# Patient Record
Sex: MALE | Race: WHITE | Employment: PART TIME | URBAN - METROPOLITAN AREA
[De-identification: names, ages, dates, MRNs, and addresses within clinical notes are randomized per-mention and may not be internally consistent; named-entity substitution may affect disease eponyms.]

---

## 2019-02-04 ENCOUNTER — HOSPITAL ENCOUNTER (OUTPATIENT)
Dept: LAB | Age: 59
Discharge: HOME | End: 2019-02-04
Attending: INTERNAL MEDICINE
Payer: MEDICAID

## 2019-02-04 DIAGNOSIS — I48.0 PAROXYSMAL ATRIAL FIBRILLATION (HCC): ICD-10-CM

## 2019-02-04 LAB
ANION GAP SERPL CALC-SCNC: 7 MMOL/L
BASOPHILS # BLD: 0.1 K/UL (ref 0–0.2)
BASOPHILS NFR BLD: 1 % (ref 0–2)
BUN SERPL-MCNC: 16 MG/DL (ref 6–23)
CALCIUM SERPL-MCNC: 8.5 MG/DL (ref 8.3–10.4)
CHLORIDE SERPL-SCNC: 107 MMOL/L (ref 98–107)
CO2 SERPL-SCNC: 26 MMOL/L (ref 21–32)
CREAT SERPL-MCNC: 1.2 MG/DL (ref 0.8–1.5)
DIFFERENTIAL METHOD BLD: NORMAL
EOSINOPHIL # BLD: 0.4 K/UL (ref 0–0.8)
EOSINOPHIL NFR BLD: 4 % (ref 0.5–7.8)
ERYTHROCYTE [DISTWIDTH] IN BLOOD BY AUTOMATED COUNT: 14.2 % (ref 11.9–14.6)
GLUCOSE SERPL-MCNC: 123 MG/DL (ref 65–100)
HCT VFR BLD AUTO: 48.8 % (ref 41.1–50.3)
HGB BLD-MCNC: 15.9 G/DL (ref 13.6–17.2)
IMM GRANULOCYTES # BLD AUTO: 0 K/UL (ref 0–0.5)
IMM GRANULOCYTES NFR BLD AUTO: 0 % (ref 0–5)
LYMPHOCYTES # BLD: 2.3 K/UL (ref 0.5–4.6)
LYMPHOCYTES NFR BLD: 28 % (ref 13–44)
MAGNESIUM SERPL-MCNC: 2.1 MG/DL (ref 1.8–2.4)
MCH RBC QN AUTO: 31.4 PG (ref 26.1–32.9)
MCHC RBC AUTO-ENTMCNC: 32.6 G/DL (ref 31.4–35)
MCV RBC AUTO: 96.3 FL (ref 79.6–97.8)
MONOCYTES # BLD: 0.9 K/UL (ref 0.1–1.3)
MONOCYTES NFR BLD: 10 % (ref 4–12)
NEUTS SEG # BLD: 4.7 K/UL (ref 1.7–8.2)
NEUTS SEG NFR BLD: 57 % (ref 43–78)
NRBC # BLD: 0 K/UL (ref 0–0.2)
PLATELET # BLD AUTO: 260 K/UL (ref 150–450)
PMV BLD AUTO: 10.1 FL (ref 9.4–12.3)
POTASSIUM SERPL-SCNC: 4.2 MMOL/L (ref 3.5–5.1)
RBC # BLD AUTO: 5.07 M/UL (ref 4.23–5.6)
SODIUM SERPL-SCNC: 140 MMOL/L (ref 136–145)
WBC # BLD AUTO: 8.4 K/UL (ref 4.3–11.1)

## 2019-02-04 PROCEDURE — 36415 COLL VENOUS BLD VENIPUNCTURE: CPT

## 2019-02-04 PROCEDURE — 83735 ASSAY OF MAGNESIUM: CPT

## 2019-02-04 PROCEDURE — 80048 BASIC METABOLIC PNL TOTAL CA: CPT

## 2019-02-04 PROCEDURE — 85025 COMPLETE CBC W/AUTO DIFF WBC: CPT

## 2019-02-06 NOTE — PROGRESS NOTES
Patient pre-assessment complete for Atrial fib ablation with Dr Pamela Ibarra scheduled for 19 at 7:30am, arrival time 6. Patient verified using . Patient instructed to bring all home medications in labeled bottles on the day of procedure. NPO status reinforced. Patient instructed to HOLD am. Instructed they can take all other medications excluding vitamins & supplements. Patient verbalizes understanding of all instructions & denies any questions at this time.

## 2019-02-07 ENCOUNTER — HOSPITAL ENCOUNTER (OUTPATIENT)
Age: 59
Setting detail: OBSERVATION
Discharge: HOME | End: 2019-02-08
Attending: INTERNAL MEDICINE | Admitting: INTERNAL MEDICINE
Payer: MEDICAID

## 2019-02-07 ENCOUNTER — APPOINTMENT (OUTPATIENT)
Dept: GENERAL RADIOLOGY | Age: 59
End: 2019-02-07
Attending: INTERNAL MEDICINE
Payer: MEDICAID

## 2019-02-07 ENCOUNTER — ANESTHESIA EVENT (OUTPATIENT)
Dept: SURGERY | Age: 59
End: 2019-02-07
Payer: MEDICAID

## 2019-02-07 ENCOUNTER — APPOINTMENT (OUTPATIENT)
Dept: CARDIAC CATH/INVASIVE PROCEDURES | Age: 59
End: 2019-02-07
Payer: MEDICAID

## 2019-02-07 ENCOUNTER — ANESTHESIA (OUTPATIENT)
Dept: SURGERY | Age: 59
End: 2019-02-07
Payer: MEDICAID

## 2019-02-07 PROBLEM — I48.91 A-FIB (HCC): Status: ACTIVE | Noted: 2019-02-07

## 2019-02-07 LAB
ACT BLD: 307 SECS (ref 70–128)
ACT BLD: 351 SECS (ref 70–128)
ANION GAP SERPL CALC-SCNC: 10 MMOL/L (ref 7–16)
ATRIAL RATE: 58 BPM
ATRIAL RATE: 61 BPM
BUN SERPL-MCNC: 17 MG/DL (ref 6–23)
CALCIUM SERPL-MCNC: 8.8 MG/DL (ref 8.3–10.4)
CALCULATED P AXIS, ECG09: 52 DEGREES
CALCULATED P AXIS, ECG09: 62 DEGREES
CALCULATED R AXIS, ECG10: 18 DEGREES
CALCULATED R AXIS, ECG10: 36 DEGREES
CALCULATED T AXIS, ECG11: 47 DEGREES
CALCULATED T AXIS, ECG11: 53 DEGREES
CHLORIDE SERPL-SCNC: 105 MMOL/L (ref 98–107)
CO2 SERPL-SCNC: 25 MMOL/L (ref 21–32)
CREAT SERPL-MCNC: 1.1 MG/DL (ref 0.8–1.5)
DIAGNOSIS, 93000: NORMAL
DIAGNOSIS, 93000: NORMAL
ERYTHROCYTE [DISTWIDTH] IN BLOOD BY AUTOMATED COUNT: 14 % (ref 11.9–14.6)
GLUCOSE SERPL-MCNC: 116 MG/DL (ref 65–100)
HCT VFR BLD AUTO: 48.4 % (ref 41.1–50.3)
HGB BLD-MCNC: 15.5 G/DL (ref 13.6–17.2)
INR PPP: 1
MAGNESIUM SERPL-MCNC: 2.3 MG/DL (ref 1.8–2.4)
MCH RBC QN AUTO: 31.3 PG (ref 26.1–32.9)
MCHC RBC AUTO-ENTMCNC: 32 G/DL (ref 31.4–35)
MCV RBC AUTO: 97.8 FL (ref 79.6–97.8)
NRBC # BLD: 0 K/UL (ref 0–0.2)
P-R INTERVAL, ECG05: 170 MS
P-R INTERVAL, ECG05: 178 MS
PLATELET # BLD AUTO: 236 K/UL (ref 150–450)
PMV BLD AUTO: 11 FL (ref 9.4–12.3)
POTASSIUM SERPL-SCNC: 3.9 MMOL/L (ref 3.5–5.1)
PROTHROMBIN TIME: 12.6 SEC (ref 11.7–14.5)
Q-T INTERVAL, ECG07: 472 MS
Q-T INTERVAL, ECG07: 482 MS
QRS DURATION, ECG06: 120 MS
QRS DURATION, ECG06: 128 MS
QTC CALCULATION (BEZET), ECG08: 463 MS
QTC CALCULATION (BEZET), ECG08: 485 MS
RBC # BLD AUTO: 4.95 M/UL (ref 4.23–5.6)
SODIUM SERPL-SCNC: 140 MMOL/L (ref 136–145)
VENTRICULAR RATE, ECG03: 58 BPM
VENTRICULAR RATE, ECG03: 61 BPM
WBC # BLD AUTO: 7.8 K/UL (ref 4.3–11.1)

## 2019-02-07 PROCEDURE — 76210000006 HC OR PH I REC 0.5 TO 1 HR: Performed by: INTERNAL MEDICINE

## 2019-02-07 PROCEDURE — 74011250637 HC RX REV CODE- 250/637: Performed by: INTERNAL MEDICINE

## 2019-02-07 PROCEDURE — C1894 INTRO/SHEATH, NON-LASER: HCPCS

## 2019-02-07 PROCEDURE — 74011250636 HC RX REV CODE- 250/636

## 2019-02-07 PROCEDURE — 85347 COAGULATION TIME ACTIVATED: CPT

## 2019-02-07 PROCEDURE — 85027 COMPLETE CBC AUTOMATED: CPT

## 2019-02-07 PROCEDURE — 99218 HC RM OBSERVATION: CPT

## 2019-02-07 PROCEDURE — C1732 CATH, EP, DIAG/ABL, 3D/VECT: HCPCS

## 2019-02-07 PROCEDURE — 76060000035 HC ANESTHESIA 2 TO 2.5 HR: Performed by: INTERNAL MEDICINE

## 2019-02-07 PROCEDURE — 77030039425 HC BLD LARYNG TRULITE DISP TELE -A: Performed by: NURSE ANESTHETIST, CERTIFIED REGISTERED

## 2019-02-07 PROCEDURE — C1733 CATH, EP, OTHR THAN COOL-TIP: HCPCS

## 2019-02-07 PROCEDURE — 77030020506 HC NDL TRNSPTL NRG BAYL -F

## 2019-02-07 PROCEDURE — 77030019908 HC STETH ESOPH SIMS -A: Performed by: NURSE ANESTHETIST, CERTIFIED REGISTERED

## 2019-02-07 PROCEDURE — 93662 INTRACARDIAC ECG (ICE): CPT

## 2019-02-07 PROCEDURE — 93655 ICAR CATH ABLTJ DSCRT ARRHYT: CPT

## 2019-02-07 PROCEDURE — C1759 CATH, INTRA ECHOCARDIOGRAPHY: HCPCS

## 2019-02-07 PROCEDURE — 71045 X-RAY EXAM CHEST 1 VIEW: CPT

## 2019-02-07 PROCEDURE — 77030013687 HC GD NDL BARD -B

## 2019-02-07 PROCEDURE — 93613 INTRACARDIAC EPHYS 3D MAPG: CPT

## 2019-02-07 PROCEDURE — 83735 ASSAY OF MAGNESIUM: CPT

## 2019-02-07 PROCEDURE — 93005 ELECTROCARDIOGRAM TRACING: CPT | Performed by: INTERNAL MEDICINE

## 2019-02-07 PROCEDURE — 93623 PRGRMD STIMJ&PACG IV RX NFS: CPT

## 2019-02-07 PROCEDURE — 77030037088 HC TUBE ENDOTRACH ORAL NSL COVD-A: Performed by: NURSE ANESTHETIST, CERTIFIED REGISTERED

## 2019-02-07 PROCEDURE — 77030027107 HC PTCH EXT REF CARTO3 J&J -F

## 2019-02-07 PROCEDURE — C1893 INTRO/SHEATH, FIXED,NON-PEEL: HCPCS

## 2019-02-07 PROCEDURE — 74011250636 HC RX REV CODE- 250/636: Performed by: ANESTHESIOLOGY

## 2019-02-07 PROCEDURE — 77030013794 HC KT TRNSDUC BLD EDWD -B

## 2019-02-07 PROCEDURE — 74011000250 HC RX REV CODE- 250

## 2019-02-07 PROCEDURE — 85610 PROTHROMBIN TIME: CPT

## 2019-02-07 PROCEDURE — 80048 BASIC METABOLIC PNL TOTAL CA: CPT

## 2019-02-07 PROCEDURE — 93312 ECHO TRANSESOPHAGEAL: CPT

## 2019-02-07 PROCEDURE — 77030035291 HC TBNG PMP SMARTABLATE J&J -B

## 2019-02-07 PROCEDURE — 93622 COMP EP EVAL L VENTR PAC&REC: CPT

## 2019-02-07 PROCEDURE — 93656 COMPRE EP EVAL ABLTJ ATR FIB: CPT

## 2019-02-07 RX ORDER — SUCRALFATE 1 G/1
1 TABLET ORAL
Qty: 120 TAB | Refills: 0 | Status: SHIPPED | OUTPATIENT
Start: 2019-02-08 | End: 2019-04-11

## 2019-02-07 RX ORDER — PANTOPRAZOLE SODIUM 40 MG/1
40 TABLET, DELAYED RELEASE ORAL EVERY 12 HOURS
Status: DISCONTINUED | OUTPATIENT
Start: 2019-02-07 | End: 2019-02-08 | Stop reason: HOSPADM

## 2019-02-07 RX ORDER — NEOSTIGMINE METHYLSULFATE 1 MG/ML
INJECTION INTRAVENOUS AS NEEDED
Status: DISCONTINUED | OUTPATIENT
Start: 2019-02-07 | End: 2019-02-07 | Stop reason: HOSPADM

## 2019-02-07 RX ORDER — SODIUM CHLORIDE, SODIUM LACTATE, POTASSIUM CHLORIDE, CALCIUM CHLORIDE 600; 310; 30; 20 MG/100ML; MG/100ML; MG/100ML; MG/100ML
INJECTION, SOLUTION INTRAVENOUS
Status: DISCONTINUED | OUTPATIENT
Start: 2019-02-07 | End: 2019-02-07 | Stop reason: HOSPADM

## 2019-02-07 RX ORDER — LORAZEPAM 0.5 MG/1
0.5 TABLET ORAL
Status: DISCONTINUED | OUTPATIENT
Start: 2019-02-07 | End: 2019-02-08 | Stop reason: HOSPADM

## 2019-02-07 RX ORDER — PANTOPRAZOLE SODIUM 40 MG/1
40 TABLET, DELAYED RELEASE ORAL EVERY 12 HOURS
Qty: 60 TAB | Refills: 0 | Status: SHIPPED | OUTPATIENT
Start: 2019-02-08 | End: 2019-11-18

## 2019-02-07 RX ORDER — SUCRALFATE 1 G/1
1 TABLET ORAL
Status: DISCONTINUED | OUTPATIENT
Start: 2019-02-07 | End: 2019-02-08 | Stop reason: HOSPADM

## 2019-02-07 RX ORDER — DEXAMETHASONE SODIUM PHOSPHATE 4 MG/ML
INJECTION, SOLUTION INTRA-ARTICULAR; INTRALESIONAL; INTRAMUSCULAR; INTRAVENOUS; SOFT TISSUE AS NEEDED
Status: DISCONTINUED | OUTPATIENT
Start: 2019-02-07 | End: 2019-02-07 | Stop reason: HOSPADM

## 2019-02-07 RX ORDER — FLECAINIDE ACETATE 100 MG/1
100 TABLET ORAL 2 TIMES DAILY
Status: DISCONTINUED | OUTPATIENT
Start: 2019-02-07 | End: 2019-02-08 | Stop reason: HOSPADM

## 2019-02-07 RX ORDER — ESCITALOPRAM OXALATE 10 MG/1
20 TABLET ORAL DAILY
Status: DISCONTINUED | OUTPATIENT
Start: 2019-02-07 | End: 2019-02-08 | Stop reason: HOSPADM

## 2019-02-07 RX ORDER — SODIUM CHLORIDE, SODIUM LACTATE, POTASSIUM CHLORIDE, CALCIUM CHLORIDE 600; 310; 30; 20 MG/100ML; MG/100ML; MG/100ML; MG/100ML
100 INJECTION, SOLUTION INTRAVENOUS CONTINUOUS
Status: DISCONTINUED | OUTPATIENT
Start: 2019-02-07 | End: 2019-02-07 | Stop reason: HOSPADM

## 2019-02-07 RX ORDER — LIDOCAINE HYDROCHLORIDE 10 MG/ML
0.3 INJECTION INFILTRATION; PERINEURAL ONCE
Status: CANCELLED | OUTPATIENT
Start: 2019-02-07 | End: 2019-02-07

## 2019-02-07 RX ORDER — SODIUM CHLORIDE, SODIUM LACTATE, POTASSIUM CHLORIDE, CALCIUM CHLORIDE 600; 310; 30; 20 MG/100ML; MG/100ML; MG/100ML; MG/100ML
100 INJECTION, SOLUTION INTRAVENOUS CONTINUOUS
Status: CANCELLED | OUTPATIENT
Start: 2019-02-07 | End: 2019-02-08

## 2019-02-07 RX ORDER — SODIUM CHLORIDE 0.9 % (FLUSH) 0.9 %
5-40 SYRINGE (ML) INJECTION EVERY 8 HOURS
Status: DISCONTINUED | OUTPATIENT
Start: 2019-02-07 | End: 2019-02-08 | Stop reason: HOSPADM

## 2019-02-07 RX ORDER — ACETAMINOPHEN 325 MG/1
650 TABLET ORAL
Status: DISCONTINUED | OUTPATIENT
Start: 2019-02-07 | End: 2019-02-08 | Stop reason: HOSPADM

## 2019-02-07 RX ORDER — MIDAZOLAM HYDROCHLORIDE 1 MG/ML
2 INJECTION, SOLUTION INTRAMUSCULAR; INTRAVENOUS
Status: CANCELLED | OUTPATIENT
Start: 2019-02-07 | End: 2019-02-08

## 2019-02-07 RX ORDER — LIDOCAINE HYDROCHLORIDE 20 MG/ML
INJECTION, SOLUTION EPIDURAL; INFILTRATION; INTRACAUDAL; PERINEURAL AS NEEDED
Status: DISCONTINUED | OUTPATIENT
Start: 2019-02-07 | End: 2019-02-07 | Stop reason: HOSPADM

## 2019-02-07 RX ORDER — OXYCODONE HYDROCHLORIDE 5 MG/1
10 TABLET ORAL
Status: DISCONTINUED | OUTPATIENT
Start: 2019-02-07 | End: 2019-02-07 | Stop reason: HOSPADM

## 2019-02-07 RX ORDER — HYDROCODONE BITARTRATE AND ACETAMINOPHEN 5; 325 MG/1; MG/1
1 TABLET ORAL
Status: DISCONTINUED | OUTPATIENT
Start: 2019-02-07 | End: 2019-02-08 | Stop reason: HOSPADM

## 2019-02-07 RX ORDER — PROTAMINE SULFATE 10 MG/ML
INJECTION, SOLUTION INTRAVENOUS AS NEEDED
Status: DISCONTINUED | OUTPATIENT
Start: 2019-02-07 | End: 2019-02-07 | Stop reason: HOSPADM

## 2019-02-07 RX ORDER — PROPOFOL 10 MG/ML
INJECTION, EMULSION INTRAVENOUS AS NEEDED
Status: DISCONTINUED | OUTPATIENT
Start: 2019-02-07 | End: 2019-02-07 | Stop reason: HOSPADM

## 2019-02-07 RX ORDER — ONDANSETRON 2 MG/ML
INJECTION INTRAMUSCULAR; INTRAVENOUS AS NEEDED
Status: DISCONTINUED | OUTPATIENT
Start: 2019-02-07 | End: 2019-02-07 | Stop reason: HOSPADM

## 2019-02-07 RX ORDER — HYDROMORPHONE HYDROCHLORIDE 2 MG/ML
0.5 INJECTION, SOLUTION INTRAMUSCULAR; INTRAVENOUS; SUBCUTANEOUS
Status: DISCONTINUED | OUTPATIENT
Start: 2019-02-07 | End: 2019-02-07 | Stop reason: HOSPADM

## 2019-02-07 RX ORDER — LISINOPRIL 5 MG/1
10 TABLET ORAL DAILY
Status: DISCONTINUED | OUTPATIENT
Start: 2019-02-07 | End: 2019-02-08 | Stop reason: HOSPADM

## 2019-02-07 RX ORDER — HEPARIN SODIUM 1000 [USP'U]/ML
INJECTION, SOLUTION INTRAVENOUS; SUBCUTANEOUS AS NEEDED
Status: DISCONTINUED | OUTPATIENT
Start: 2019-02-07 | End: 2019-02-07 | Stop reason: HOSPADM

## 2019-02-07 RX ORDER — ROCURONIUM BROMIDE 10 MG/ML
INJECTION, SOLUTION INTRAVENOUS AS NEEDED
Status: DISCONTINUED | OUTPATIENT
Start: 2019-02-07 | End: 2019-02-07 | Stop reason: HOSPADM

## 2019-02-07 RX ORDER — GLYCOPYRROLATE 0.2 MG/ML
INJECTION INTRAMUSCULAR; INTRAVENOUS AS NEEDED
Status: DISCONTINUED | OUTPATIENT
Start: 2019-02-07 | End: 2019-02-07 | Stop reason: HOSPADM

## 2019-02-07 RX ORDER — SODIUM CHLORIDE 0.9 % (FLUSH) 0.9 %
5-40 SYRINGE (ML) INJECTION AS NEEDED
Status: DISCONTINUED | OUTPATIENT
Start: 2019-02-07 | End: 2019-02-08 | Stop reason: HOSPADM

## 2019-02-07 RX ORDER — HEPARIN SODIUM 5000 [USP'U]/100ML
INJECTION, SOLUTION INTRAVENOUS
Status: DISCONTINUED | OUTPATIENT
Start: 2019-02-07 | End: 2019-02-07 | Stop reason: HOSPADM

## 2019-02-07 RX ORDER — FENTANYL CITRATE 50 UG/ML
INJECTION, SOLUTION INTRAMUSCULAR; INTRAVENOUS AS NEEDED
Status: DISCONTINUED | OUTPATIENT
Start: 2019-02-07 | End: 2019-02-07 | Stop reason: HOSPADM

## 2019-02-07 RX ADMIN — HYDROCODONE BITARTRATE AND ACETAMINOPHEN 1 TABLET: 5; 325 TABLET ORAL at 11:16

## 2019-02-07 RX ADMIN — PROTAMINE SULFATE 10 MG: 10 INJECTION, SOLUTION INTRAVENOUS at 09:25

## 2019-02-07 RX ADMIN — SUCRALFATE 1 G: 1 TABLET ORAL at 11:16

## 2019-02-07 RX ADMIN — PROTAMINE SULFATE 10 MG: 10 INJECTION, SOLUTION INTRAVENOUS at 09:23

## 2019-02-07 RX ADMIN — ROCURONIUM BROMIDE 10 MG: 10 INJECTION, SOLUTION INTRAVENOUS at 08:42

## 2019-02-07 RX ADMIN — HYDROMORPHONE HYDROCHLORIDE 0.5 MG: 2 INJECTION, SOLUTION INTRAMUSCULAR; INTRAVENOUS; SUBCUTANEOUS at 09:57

## 2019-02-07 RX ADMIN — HYDROCODONE BITARTRATE AND ACETAMINOPHEN 1 TABLET: 5; 325 TABLET ORAL at 18:31

## 2019-02-07 RX ADMIN — PANTOPRAZOLE SODIUM 40 MG: 40 TABLET, DELAYED RELEASE ORAL at 11:16

## 2019-02-07 RX ADMIN — PROTAMINE SULFATE 10 MG: 10 INJECTION, SOLUTION INTRAVENOUS at 09:19

## 2019-02-07 RX ADMIN — PROTAMINE SULFATE 10 MG: 10 INJECTION, SOLUTION INTRAVENOUS at 09:24

## 2019-02-07 RX ADMIN — LISINOPRIL 10 MG: 5 TABLET ORAL at 11:16

## 2019-02-07 RX ADMIN — PANTOPRAZOLE SODIUM 40 MG: 40 TABLET, DELAYED RELEASE ORAL at 21:32

## 2019-02-07 RX ADMIN — PROTAMINE SULFATE 10 MG: 10 INJECTION, SOLUTION INTRAVENOUS at 09:18

## 2019-02-07 RX ADMIN — HEPARIN SODIUM 10000 UNITS: 1000 INJECTION, SOLUTION INTRAVENOUS; SUBCUTANEOUS at 08:10

## 2019-02-07 RX ADMIN — SUCRALFATE 1 G: 1 TABLET ORAL at 21:32

## 2019-02-07 RX ADMIN — RIVAROXABAN 20 MG: 20 TABLET, FILM COATED ORAL at 18:31

## 2019-02-07 RX ADMIN — ROCURONIUM BROMIDE 10 MG: 10 INJECTION, SOLUTION INTRAVENOUS at 08:09

## 2019-02-07 RX ADMIN — NEOSTIGMINE METHYLSULFATE 3 MG: 1 INJECTION INTRAVENOUS at 09:17

## 2019-02-07 RX ADMIN — PROPOFOL 200 MG: 10 INJECTION, EMULSION INTRAVENOUS at 07:34

## 2019-02-07 RX ADMIN — PROTAMINE SULFATE 10 MG: 10 INJECTION, SOLUTION INTRAVENOUS at 09:20

## 2019-02-07 RX ADMIN — HYDROMORPHONE HYDROCHLORIDE 0.5 MG: 2 INJECTION, SOLUTION INTRAMUSCULAR; INTRAVENOUS; SUBCUTANEOUS at 10:01

## 2019-02-07 RX ADMIN — HEPARIN SODIUM 10000 UNITS: 1000 INJECTION, SOLUTION INTRAVENOUS; SUBCUTANEOUS at 07:57

## 2019-02-07 RX ADMIN — HYDROCODONE BITARTRATE AND ACETAMINOPHEN 1 TABLET: 5; 325 TABLET ORAL at 22:50

## 2019-02-07 RX ADMIN — HYDROCODONE BITARTRATE AND ACETAMINOPHEN 1 TABLET: 5; 325 TABLET ORAL at 14:49

## 2019-02-07 RX ADMIN — ROCURONIUM BROMIDE 5 MG: 10 INJECTION, SOLUTION INTRAVENOUS at 07:57

## 2019-02-07 RX ADMIN — Medication 10 ML: at 21:32

## 2019-02-07 RX ADMIN — PROPOFOL 50 MG: 10 INJECTION, EMULSION INTRAVENOUS at 08:42

## 2019-02-07 RX ADMIN — DEXAMETHASONE SODIUM PHOSPHATE 4 MG: 4 INJECTION, SOLUTION INTRA-ARTICULAR; INTRALESIONAL; INTRAMUSCULAR; INTRAVENOUS; SOFT TISSUE at 08:51

## 2019-02-07 RX ADMIN — GLYCOPYRROLATE 0.4 MG: 0.2 INJECTION INTRAMUSCULAR; INTRAVENOUS at 09:17

## 2019-02-07 RX ADMIN — FLECAINIDE ACETATE 100 MG: 100 TABLET ORAL at 11:16

## 2019-02-07 RX ADMIN — ONDANSETRON 4 MG: 2 INJECTION INTRAMUSCULAR; INTRAVENOUS at 08:51

## 2019-02-07 RX ADMIN — ESCITALOPRAM OXALATE 20 MG: 10 TABLET ORAL at 11:16

## 2019-02-07 RX ADMIN — SUCRALFATE 1 G: 1 TABLET ORAL at 18:31

## 2019-02-07 RX ADMIN — Medication 10 ML: at 18:32

## 2019-02-07 RX ADMIN — ROCURONIUM BROMIDE 10 MG: 10 INJECTION, SOLUTION INTRAVENOUS at 08:18

## 2019-02-07 RX ADMIN — FENTANYL CITRATE 100 MCG: 50 INJECTION, SOLUTION INTRAMUSCULAR; INTRAVENOUS at 07:34

## 2019-02-07 RX ADMIN — PROTAMINE SULFATE 20 MG: 10 INJECTION, SOLUTION INTRAVENOUS at 09:22

## 2019-02-07 RX ADMIN — ROCURONIUM BROMIDE 10 MG: 10 INJECTION, SOLUTION INTRAVENOUS at 09:00

## 2019-02-07 RX ADMIN — PROTAMINE SULFATE 20 MG: 10 INJECTION, SOLUTION INTRAVENOUS at 09:21

## 2019-02-07 RX ADMIN — PROPOFOL 50 MG: 10 INJECTION, EMULSION INTRAVENOUS at 08:09

## 2019-02-07 RX ADMIN — LIDOCAINE HYDROCHLORIDE 80 MG: 20 INJECTION, SOLUTION EPIDURAL; INFILTRATION; INTRACAUDAL; PERINEURAL at 07:34

## 2019-02-07 RX ADMIN — SODIUM CHLORIDE, SODIUM LACTATE, POTASSIUM CHLORIDE, CALCIUM CHLORIDE: 600; 310; 30; 20 INJECTION, SOLUTION INTRAVENOUS at 07:24

## 2019-02-07 RX ADMIN — FLECAINIDE ACETATE 100 MG: 100 TABLET ORAL at 18:31

## 2019-02-07 RX ADMIN — HEPARIN SODIUM 40 UNITS/KG/HR: 5000 INJECTION, SOLUTION INTRAVENOUS at 08:10

## 2019-02-07 RX ADMIN — ROCURONIUM BROMIDE 50 MG: 10 INJECTION, SOLUTION INTRAVENOUS at 07:34

## 2019-02-07 NOTE — PROCEDURES
Pre-Electrophysiology Diagnosis  1. Atrial fibrillation failing medical therapy     Procedure Performed  1. EPS afib ablation/pulmonary vein isolation  2. Left atrial pacing recording from the coronary sinus. 3. 3-D Electroanatomical mapping  4. Intracardiac echo  5. Ablation of second arrhythmia  6. LV pacing and recording  7. Transesophageal echo  8. Drug infusion    Anesthesia: General     Estimated Blood Loss: Less than 10 mL     Specimens: * No specimens in log *    Procedure in Detail:  The patient was brought to the electrophysiology lab in the fasting state. The patient was intubated by anesthesiology and an esophageal temperature probe inserted and advanced to a location directly posterior to the LA at the level of the pulmonary veins. The esophageal temperature probe was repositioned throughout the case to a location as close the the ablation catheter as possible. If the esophageal temperature increased 0.5 degrees Celsius ablation was stopped until the temperature returned to baseline. A tranesophageal echocardiogram was performed directly prior to the procedure and was negative for a JOHN thrombus (see full report in chart). A Ref-Star CARTO patch was placed, the patient was then prepped and draped in sterile fashion. Venous access was obtained under ultrasound guidance x4 using modified Seldinger technique, with placement of one 8Fr short sidearm sheath and two 8.5 Fr SL-O 63cm long braided sheaths in the right femoral vein and placement of a 10Fr sheath into the left femoral vein. An intra-cardiac echo probe was prepped, and then inserted into an 10 Fr short sheath and used to localize the fossa ovalis and create LA and pulmonary vein anatomy utilizing [employer] [facility]. A [employer] (P)ter Pentaray catheter was then inserted into an 8.5 SLO Fr sheath and used to create an electroanatomical map of the right atrium, including attempts at His localization and the os of the CS.   Then a Smart Touch porous irrigated BW 3.5mm ablation catheter was used to map out the body of the CS. A decapolar Bios[employer] Girard CS catheter was inserted via an 8Fr sheath and positioned in the mid coronary sinus. Next, a trans-septal needle was inserted into the SLO and was used to perform a trans-septal puncture with assistance from ICE (intra-cardiac echo) as well as the [address] [place] [address],[address] map and the right atrial impedence map. The SLO sheath was advanced into the LA. Total weight based heparin bolus was administered just after transeptal access, and systemic blood pressure monitored invasively. The patient was then placed on our heparin weight based nomogram for targeted ACT of 300-350 during the ablation procedure. A second trans-septal access was obtained with the ablation catheter. The Pentaray catheter was then inserted into the LA via the SL-O sheath and was used to create a detail electroanatomical voltage map of the left atrium including the pulmonary veins to identify the pulmonary vein sleeves and further guide additional ablation as pictured below. The Pentaray was used to map pulmonary vein potentials and target ablation. An 8 Fr, 3.5mm tip saline irrigated bidirectional D/F curve Thermo-cool SmartTouch catheter was used for RF ablation and ablation was performed at 40W unless ablation was in the proximity of the esophagus where ablation was performed at 25W. Antral pulmonary vein isolation was then performed for all 4 pulmonary veins. Entrance and exit block was demonstrated by left atrial pacing and within the pulmonary veins. Lack of any conducted atrial EGMs into the pulmonary veins was documented. Prior to ablation of the right antral pulmonary veins, the ablation catheter was used to pace at high output to try to localize the right phrenic nerve and map its location prior to ablation.  Adenosine was injected (12 mg) to demonstrate the lack of early reconnection with the Pentaray in the PVs. There was early reconnection with adenosine within the right pulmonary veins. The ablation catheter was inserted into the LV, documented LV electrograms and was used to pace the LV for the EP study and for rescue pacing during adenosine infusion. Post PVI, the Pentaray was used to perform a second LA voltage map post ablation to demonstrate pulmonary vein isolation and post ablation voltage as pictured below. Baseline LA Voltage Map      Post Ablation LA Voltage Map      Cavotricuspid Isthmus ablation (right atrial flutter)  Linear ablation across the cavo-tricuspid isthmus was performed starting with 1:2 A:V EGMs along the isthmus at 6pm Bulgarian. The local electrogram activation sequence, differential pacing maneuvers and electrogram timing was used to demonstrate bidirectional block along the cavotricuspid isthmus. Post-Ablation trans-isthmus time: 161 msec    Next, baseline recordings and a diagnostic EP study was performed. The coronary sinus multipolar catheter was used to pace the left atrium during the EP study. The LA CS electrograms were documented and interpreted during the procedure. A comprehensive EP study was performed with 1:1 AV decremental pacing, atrial extrastimuli and ventricular pacing to assess retrograde conduction. The patient did not have sustained slow pathway conduction or evidence of an accessory pathway. Ventricular pacing revealed retrograde AV conduction which was concentric and decremental.    At the completion of the ablation and EPS, all catheters were removed, 100mg Protamine was administered and sheaths were pulled after placing a figure of 8 stitch. The patient tolerated the procedure well with no acute complications recognized. The patient left the EP lab in stable condition, in normal sinus rhythm. Just prior to pulling shealths, the ICE catheter was used to obtain ultrasound images and revealed no evidence of pericardial effusion.     Baseline Intervals    QRS duration: 96 msec  KS interval: 171 msec  RR interval: 1012 msec  AH interval: 85 msec  HV interval: 47 msec    EP Study    AV Wenchebach: 360 msec  AV zeb ERP: < or equal to 250 msec  Atrial ERP: 250 msec  VA Wenchebach: No VA conduction    Complications: None    Summary:   1. Successful pulmonary vein isolation x4.  2. Creation of a line of bidirectional block at the cavotricuspid isthmus. 3.         Comprehensive EP study. 4. Pt tolerated the procedure well. 5. Family updated. Dorys Almendarez MD, MS  Clinical Cardiac Electrophysiology  2/7/2019  9:25 AM

## 2019-02-07 NOTE — PROGRESS NOTES
Spiritual Care Visit, initial visit. Visited with patient at bedside. Prayed for patient's healing and health. Visit by Jose Antonio Garcia, Staff .  Michael., Saeid.JOEL., B.A.

## 2019-02-07 NOTE — ANESTHESIA PREPROCEDURE EVALUATION
Anesthetic History   No history of anesthetic complications            Review of Systems / Medical History  Patient summary reviewed and pertinent labs reviewed    Pulmonary          Smoker      Comments: Suspect YANIV, no sleep study   Neuro/Psych              Cardiovascular    Hypertension  Valvular problems/murmurs (mild aortic stenosis IBRAHIMA 1.77cm2 echo 2017)      Dysrhythmias : atrial fibrillation      Exercise tolerance: >4 METS     GI/Hepatic/Renal  Within defined limits              Endo/Other        Obesity     Other Findings              Physical Exam    Airway  Mallampati: II  TM Distance: 4 - 6 cm  Neck ROM: normal range of motion   Mouth opening: Normal     Cardiovascular    Rhythm: regular  Rate: abnormal    Murmur: Grade 2, Aortic area    Comments: Sinus jeff on EKG Dental    Dentition: Lower partial plate     Pulmonary  Breath sounds clear to auscultation               Abdominal         Other Findings            Anesthetic Plan    ASA: 3  Anesthesia type: general          Induction: Intravenous  Anesthetic plan and risks discussed with: Patient and Spouse

## 2019-02-07 NOTE — PROGRESS NOTES
Pt transferred to CV ICU. CM reviewed pt's chart. No identified needs at this time per chart. DECO aware of pt's insurance status and his information has been passed to [address] for review per chart notes. CM to continue to follow.

## 2019-02-07 NOTE — PERIOP NOTES
Dr. Kalli Krause ok with sending pt over at this time.
TRANSFER - OUT REPORT:    Verbal report given to Nicholas Gonzalez (name) on Deven Medina  being transferred to CVICU 1(unit) for routine post - op       Report consisted of patients Situation, Background, Assessment and   Recommendations(SBAR). Information from the following report(s) SBAR, OR Summary and Procedure Summary was reviewed with the receiving nurse. Lines:   Peripheral IV 02/07/19 Right Antecubital (Active)   Site Assessment Clean, dry, & intact 2/7/2019 10:08 AM   Phlebitis Assessment 0 2/7/2019 10:08 AM   Infiltration Assessment 0 2/7/2019 10:08 AM   Dressing Status Clean, dry, & intact 2/7/2019 10:08 AM   Dressing Type Tape;Transparent 2/7/2019 10:08 AM   Hub Color/Line Status Pink;Capped 2/7/2019 10:08 AM       Peripheral IV 02/07/19 Left;Posterior Hand (Active)   Site Assessment Clean, dry, & intact 2/7/2019 10:08 AM   Phlebitis Assessment 0 2/7/2019 10:08 AM   Infiltration Assessment 0 2/7/2019 10:08 AM   Dressing Status Clean, dry, & intact 2/7/2019 10:08 AM   Dressing Type Tape;Transparent 2/7/2019 10:08 AM   Hub Color/Line Status Pink; Infusing 2/7/2019 10:08 AM        Opportunity for questions and clarification was provided. Patient transported with:   O2 @ 4 liters    VTE prophylaxis orders have been written for Deven Medina. Patient and family given floor number and nurses name. Family updated re: pt status after security code verified.
ekg called at this time
English

## 2019-02-07 NOTE — ANESTHESIA POSTPROCEDURE EVALUATION
Procedure(s):  MOSES/ ABLATION OF ATRIAL FIBRILLATION.     Anesthesia Post Evaluation      Multimodal analgesia: multimodal analgesia used between 6 hours prior to anesthesia start to PACU discharge  Patient location during evaluation: PACU  Patient participation: complete - patient participated  Level of consciousness: awake  Pain management: adequate  Airway patency: patent  Anesthetic complications: no  Cardiovascular status: acceptable  Respiratory status: acceptable  Hydration status: acceptable  Post anesthesia nausea and vomiting:  none      Visit Vitals  /67 (BP 1 Location: Right arm, BP Patient Position: At rest)   Pulse 65   Temp 36.6 °C (97.8 °F)   Resp 15   Ht 6' 2\" (1.88 m)   Wt 117.9 kg (260 lb)   SpO2 91%   BMI 33.38 kg/m²

## 2019-02-07 NOTE — PROGRESS NOTES
TRANSFER - IN REPORT:    Verbal report received from BARTOLO You(name) on Siena Villegas  being received from [facility]) for routine post - op      Report consisted of patients Situation, Background, Assessment and   Recommendations(SBAR). Information from the following report(s) SBAR, Kardex, OR Summary, Procedure Summary, Intake/Output, MAR, Accordion, Recent Results and Cardiac Rhythm NSR with PAC's was reviewed with the receiving nurse. Opportunity for questions and clarification was provided. Assessment completed upon patients arrival to unit and care assumed.

## 2019-02-07 NOTE — PROGRESS NOTES
Patient received to [address] [place] [address] room # 9  Ambulatory from [facility]. Patient scheduled for MOSES/AFib Ablation today with Dr Farhan More. Procedure reviewed & questions answered, voiced good understanding consent obtained & placed on chart. All medications and medical history reviewed. Will prep patient per orders. Patient & family updated on plan of care. The patient has a fraility score of 2-WELL, based on patient A&Ox3, patient able to ambulate to room without difficulty.

## 2019-02-08 VITALS
RESPIRATION RATE: 18 BRPM | HEIGHT: 74 IN | BODY MASS INDEX: 34.55 KG/M2 | SYSTOLIC BLOOD PRESSURE: 123 MMHG | DIASTOLIC BLOOD PRESSURE: 86 MMHG | WEIGHT: 269.18 LBS | OXYGEN SATURATION: 95 % | HEART RATE: 64 BPM | TEMPERATURE: 98 F

## 2019-02-08 LAB
ANION GAP SERPL CALC-SCNC: 8 MMOL/L (ref 7–16)
ATRIAL RATE: 64 BPM
BUN SERPL-MCNC: 15 MG/DL (ref 6–23)
CALCIUM SERPL-MCNC: 8.6 MG/DL (ref 8.3–10.4)
CALCULATED P AXIS, ECG09: 59 DEGREES
CALCULATED R AXIS, ECG10: 60 DEGREES
CALCULATED T AXIS, ECG11: 62 DEGREES
CHLORIDE SERPL-SCNC: 103 MMOL/L (ref 98–107)
CO2 SERPL-SCNC: 27 MMOL/L (ref 21–32)
CREAT SERPL-MCNC: 1.02 MG/DL (ref 0.8–1.5)
DIAGNOSIS, 93000: NORMAL
GLUCOSE SERPL-MCNC: 120 MG/DL (ref 65–100)
MAGNESIUM SERPL-MCNC: 2.3 MG/DL (ref 1.8–2.4)
P-R INTERVAL, ECG05: 166 MS
POTASSIUM SERPL-SCNC: 3.9 MMOL/L (ref 3.5–5.1)
Q-T INTERVAL, ECG07: 448 MS
QRS DURATION, ECG06: 118 MS
QTC CALCULATION (BEZET), ECG08: 462 MS
SODIUM SERPL-SCNC: 138 MMOL/L (ref 136–145)
VENTRICULAR RATE, ECG03: 64 BPM

## 2019-02-08 PROCEDURE — 83735 ASSAY OF MAGNESIUM: CPT

## 2019-02-08 PROCEDURE — 36415 COLL VENOUS BLD VENIPUNCTURE: CPT

## 2019-02-08 PROCEDURE — 74011250637 HC RX REV CODE- 250/637: Performed by: INTERNAL MEDICINE

## 2019-02-08 PROCEDURE — 99218 HC RM OBSERVATION: CPT

## 2019-02-08 PROCEDURE — 80048 BASIC METABOLIC PNL TOTAL CA: CPT

## 2019-02-08 PROCEDURE — 93005 ELECTROCARDIOGRAM TRACING: CPT | Performed by: INTERNAL MEDICINE

## 2019-02-08 RX ADMIN — HYDROCODONE BITARTRATE AND ACETAMINOPHEN 1 TABLET: 5; 325 TABLET ORAL at 07:24

## 2019-02-08 RX ADMIN — ESCITALOPRAM OXALATE 20 MG: 10 TABLET ORAL at 08:29

## 2019-02-08 RX ADMIN — LISINOPRIL 10 MG: 5 TABLET ORAL at 08:29

## 2019-02-08 RX ADMIN — FLECAINIDE ACETATE 100 MG: 100 TABLET ORAL at 08:29

## 2019-02-08 RX ADMIN — SUCRALFATE 1 G: 1 TABLET ORAL at 07:24

## 2019-02-08 RX ADMIN — PANTOPRAZOLE SODIUM 40 MG: 40 TABLET, DELAYED RELEASE ORAL at 08:29

## 2019-02-08 RX ADMIN — LEVOTHYROXINE SODIUM 137 MCG: 50 TABLET ORAL at 07:24

## 2019-02-08 RX ADMIN — LORAZEPAM 0.5 MG: 0.5 TABLET ORAL at 03:37

## 2019-02-08 NOTE — DISCHARGE SUMMARY
[address] [place] [address] Cardiology Discharge Summary     Patient ID:  Sarah Henson  [id number]  35 y.o.  1960    Admit date: 2/7/2019    Discharge date:  2/8/19    Admitting Physician: Maryse Toro MD     Discharge Physician: Claudette Mills NP/Dr. Rut Mahoney    Admission Diagnoses: Paroxysmal atrial fibrillation ([facility] [I48.0]  A-fib [facility]) [I48.91]    Discharge Diagnoses:    Diagnosis    A-fib ([facility]    Hypertension    [facility]    Hypothyroidism       Cardiology Procedures this admission:  AFIB ablation  Consults: None    Hospital Course: Patient was seen at the office of [address] [place] [address] Cardiology by Dr. Raeann Ivey for management of AFIB and was subsequently scheduled for an AM Admission ablation at [facility] on 2/7/19. Patient underwent ablation by Dr. Raeann Ivey. Patient tolerated the procedure well and was taken to telemetry for recovery. The morning of discharge, patient was up feeling well without any complaints of chest pain or shortness of breath. Patient's bilateral cath sites were clean, dry and intact without hematoma or bruit. Patient's labs were WNL. Patient was seen and examined by Dr. Rut Mahoney and determined stable and ready for discharge. Patient was instructed on the importance of medication compliance including taking carafate, PPI and Xarelto everyday without missing a dose. The patient will follow up with [address] [place] [address] Cardiology -- Dr. Raeann Ivey in 2-4 weeks. DISPOSITION: The patient is being discharged home in stable condition on a low saturated fat, low cholesterol and low salt diet. The patient is instructed to advance activities as tolerated to the limit of fatigue or shortness of breath. The patient is instructed to avoid all heavy lifting, straining, stooping or squatting for 3-5 days.  The patient is instructed to watch the cath site for bleeding/oozing; if seen, the patient is instructed to apply firm pressure with a clean cloth and call [address] Cardiology at [phone]. The patient is instructed to watch for signs of infection which include: increasing area of redness, fever/hot to touch or purulent drainage at the catheterization site. The patient is instructed not to soak in a bathtub for 7-10 days, but is cleared to shower. Discharge Exam:Patient has been seen by Dr. Valencia Precise: see his progress note for exam details.      Visit Vitals  /79 (BP 1 Location: Right arm, BP Patient Position: Head of bed elevated (Comment degrees))   Pulse 61   Temp 98.3 °F (36.8 °C)   Resp 16   Ht 6' 2\" (1.88 m)   Wt 117.9 kg (260 lb)   SpO2 97%   BMI 33.38 kg/m²         Recent Results (from the past 24 hour(s))   CBC W/O DIFF    Collection Time: 02/07/19  6:19 AM   Result Value Ref Range    WBC 7.8 4.3 - 11.1 K/uL    RBC 4.95 4.23 - 5.6 M/uL    HGB 15.5 13.6 - 17.2 g/dL    HCT 48.4 41.1 - 50.3 %    MCV 97.8 79.6 - 97.8 FL    MCH 31.3 26.1 - 32.9 PG    MCHC 32.0 31.4 - 35.0 g/dL    RDW 14.0 11.9 - 14.6 %    PLATELET 404 314 - 149 K/uL    MPV 11.0 9.4 - 12.3 FL    ABSOLUTE NRBC 0.00 0.0 - 0.2 K/uL   MAGNESIUM    Collection Time: 02/07/19  6:19 AM   Result Value Ref Range    Magnesium 2.3 1.8 - 2.4 mg/dL   METABOLIC PANEL, BASIC    Collection Time: 02/07/19  6:19 AM   Result Value Ref Range    Sodium 140 136 - 145 mmol/L    Potassium 3.9 3.5 - 5.1 mmol/L    Chloride 105 98 - 107 mmol/L    CO2 25 21 - 32 mmol/L    Anion gap 10 7 - 16 mmol/L    Glucose 116 (H) 65 - 100 mg/dL    BUN 17 6 - 23 MG/DL    Creatinine 1.10 0.8 - 1.5 MG/DL    GFR est AA >60 >60 ml/min/1.73m2    GFR est non-AA >60 >60 ml/min/1.73m2    Calcium 8.8 8.3 - 10.4 MG/DL   PROTHROMBIN TIME + INR    Collection Time: 02/07/19  6:19 AM   Result Value Ref Range    Prothrombin time 12.6 11.7 - 14.5 sec    INR 1.0     EKG, 12 LEAD, INITIAL    Collection Time: 02/07/19  6:55 AM   Result Value Ref Range    Ventricular Rate 58 BPM    Atrial Rate 58 BPM    P-R Interval 170 ms    QRS Duration 120 ms    Q-T Interval 472 ms    QTC Calculation (Bezet) 463 ms    Calculated P Axis 52 degrees    Calculated R Axis 18 degrees    Calculated T Axis 53 degrees    Diagnosis       Sinus bradycardia  Non-specific intra-ventricular conduction delay  Borderline ECG  When compared with ECG of 21-OCT-2016 11:01,  QT has lengthened  Confirmed by ELBA VILLEGAS (), Doe Menchaca ([id number]) on 2/7/2019 8:30:35 AM     POC ACTIVATED CLOTTING TIME    Collection Time: 02/07/19  8:20 AM   Result Value Ref Range    Activated Clotting Time (POC) 351 (H) 70 - 128 SECS   POC ACTIVATED CLOTTING TIME    Collection Time: 02/07/19  8:53 AM   Result Value Ref Range    Activated Clotting Time (POC) 307 (H) 70 - 128 SECS   EKG, 12 LEAD, INITIAL    Collection Time: 02/07/19  9:57 AM   Result Value Ref Range    Ventricular Rate 61 BPM    Atrial Rate 61 BPM    P-R Interval 178 ms    QRS Duration 128 ms    Q-T Interval 482 ms    QTC Calculation (Bezet) 485 ms    Calculated P Axis 62 degrees    Calculated R Axis 36 degrees    Calculated T Axis 47 degrees    Diagnosis       Sinus rhythm with Premature atrial complexes  Non-specific intra-ventricular conduction block  Abnormal ECG  When compared with ECG of 07-FEB-2019 06:55,  Premature atrial complexes are now Present  Confirmed by Braulio Cruz ([id number]) on 2/7/2019 10:17:22 AM           Patient Instructions:   Current Discharge Medication List      START taking these medications    Details   sucralfate (CARAFATE) 1 gram tablet Take 1 Tab by mouth Before breakfast, lunch, dinner and at bedtime. Qty: 120 Tab, Refills: 0      rivaroxaban (XARELTO) 20 mg tab tablet Take 1 Tab by mouth daily (with dinner). Qty: 30 Tab, Refills: 6      pantoprazole (PROTONIX) 40 mg tablet Take 1 Tab by mouth every twelve (12) hours. Qty: 60 Tab, Refills: 0         CONTINUE these medications which have NOT CHANGED    Details   LORazepam (ATIVAN) 0.5 mg tablet Take 0.5 mg by mouth two (2) times daily as needed.       escitalopram oxalate (LEXAPRO) 20 mg tablet Take 20 mg by mouth daily. levothyroxine (SYNTHROID) 137 mcg tablet Take  by mouth Daily (before breakfast). atorvastatin calcium (ATORVASTATIN PO) Take  by mouth every other day. flecainide (TAMBOCOR) 100 mg tablet Take 1 Tab by mouth two (2) times a day. Qty: 180 Tab, Refills: 3      lisinopril (PRINIVIL, ZESTRIL) 10 mg tablet Take 1 Tab by mouth daily. Qty: 90 Tab, Refills: 3      ondansetron (ZOFRAN ODT) 4 mg disintegrating tablet Take 1 Tab by mouth every eight (8) hours as needed for Nausea.   Qty: 8 Tab, Refills: 2         STOP taking these medications       aspirin (ASPIRIN) 325 mg tablet Comments:   Reason for Stopping:                 Signed:  Josep Waters NP  2/7/2019  9:44 PM

## 2019-02-08 NOTE — DISCHARGE INSTRUCTIONS
Patient Education        Electrophysiology Study and Catheter Ablation: What to Expect at [address] [place] [address] had an electrophysiology study for a problem with your heartbeat. You may also have had a catheter ablation to try to correct the problem. You may have swelling, bruising, or a small lump around the site where the catheters went into your body. These should go away in 3 to 4 weeks. Do not exercise hard or lift anything heavy for a week. You may be able to go back to work and to your normal routine in 1 or 2 days. This care sheet gives you a general idea about how long it will take for you to recover. But each person recovers at a different pace. Follow the steps below to get better as quickly as possible. How can you care for yourself at home? Activity    · For 1 week, do not lift anything that would make you strain. This may include heavy grocery bags and milk containers, a heavy briefcase or backpack, cat litter or dog food bags, a vacuum , or a child.     · For 1 week, do not exercise hard or do any activity that could strain your blood vessels or the site where the catheters went into your body.     · Ask your doctor when it is okay to have sex.     · You may shower 24 to 48 hours after the procedure, if your doctor okays it. Pat the incision dry. Do not take a bath for 1 week, or until your doctor tells you it is okay. Diet   · You can eat your normal diet. If your stomach is upset, try bland, low-fat foods like plain rice, broiled chicken, toast, and yogurt.     · Drink plenty of fluids (unless your doctor tells you not to). Medicines    · Your doctor will tell you if and when you can restart your medicines. He or she will also give you instructions about taking any new medicines.     · If you take blood thinners, such as warfarin (Coumadin), clopidogrel (Plavix), or aspirin, be sure to talk to your doctor. He or she will tell you if and when to start taking those medicines again. Make sure that you understand exactly what your doctor wants you to do.     · Ask your doctor if you can take acetaminophen (Tylenol) for pain. Do not take aspirin for 3 days, unless your doctor says it is okay.     · Check with your doctor before you take aspirin or anti-inflammatory medicines to reduce pain and swelling. These include ibuprofen (Advil, Motrin) and naproxen (Aleve).   · Make sure you know which heart medicines to continue and which ones to stop. Ask your doctor if you are not sure.   J[facility]on site care    · You can remove your bandages the day after the procedure.     · If you are bleeding, lie down and press on the area for 15 minutes to try to make it stop. If the bleeding does not stop, call your doctor or seek immediate medical care. Follow-up care is a key part of your treatment and safety. Be sure to make and go to all appointments, and call your doctor if you are having problems. It's also a good idea to know your test results and keep a list of the medicines you take. When should you call for help? Call 911 anytime you think you may need emergency care. For example, call if:    · You passed out (lost consciousness).     · You have symptoms of a heart attack. These may include:  ? Chest pain or pressure, or a strange feeling in the chest.  ? Sweating. ? Shortness of breath. ? Nausea or vomiting. ? Pain, pressure, or a strange feeling in the back, neck, jaw, or upper belly, or in one or both shoulders or arms. ? Lightheadedness or sudden weakness. ? A fast or irregular heartbeat. After you call 911, the  may tell you to chew 1 adult-strength or 2 to 4 low-dose aspirin. Wait for an ambulance. Do not try to drive yourself.     · You have symptoms of a stroke. These may include:  ? Sudden numbness, tingling, weakness, or loss of movement in your face, arm, or leg, especially on only one side of your body. ? Sudden vision changes. ? Sudden trouble speaking.   ? Sudden confusion or trouble understanding simple statements. ? Sudden problems with walking or balance. ? A sudden, severe headache that is different from past headaches.    Call your doctor now or seek immediate medical care if:    · You are bleeding from the area where the catheter was put in your artery.     · You have a fast-growing, painful lump at the catheter site.     · You have signs of infection, such as:  ? Increased pain, swelling, warmth, or redness. ? Red streaks leading from the catheter site. ? Pus draining from the catheter site. ? A fever.     · Your leg or arm looks blue or feels cold, numb, or tingly.    Watch closely for any changes in your health, and be sure to contact your doctor if you have any problems. Where can you learn more? Go to http://ken-serena.info/. Enter [phone] in the search box to learn more about \"Electrophysiology Study and Catheter Ablation: What to Expect at Home. \"  Current as of: July 22, 2018  Content Version: 11.9  © 1589-1297 [employer]. Care instructions adapted under license by [employer] (which disclaims liability or warranty for this information). If you have questions about a medical condition or this instruction, always ask your healthcare professional. [place] [address] any warranty or liability for your use of this information. BSHSI:BLADE:[employer] DISCHARGE SUMMARY NOTE    DISCHARGE SUMMARY:     Patient ID:  Name: Mala Medellin  Discharge Date: 2/8/2019  Time: 7:00 AM    The following personal items collected during your admission are returned to you:   Dental Appliance: Dental Appliances: None  Vision: Visual Aid: None  Hearing Aid:    Jewelry: Jewelry: None  Clothing: Clothing: None  Other Valuables:  Other Valuables: None  Valuables sent to safe:        PATIENT INSTRUCTIONS:    Stroke risk factors: overweight, smoking, sedentary lifestyle    Call 911 immediately if sudden numbness or weakness of an extremity is noted. Warning signs of a stroke include:    · Sudden numbness or weakness of the face (is smile equal on both sides?)  · Sudden numbness or weakness of the arm or leg, especially on one side of the body  · Sudden confusion, trouble speaking or understanding. I have reviewed discharge instructions with the patient. The patient verbalized understanding.

## 2019-03-06 PROBLEM — Q23.1 BICUSPID AORTIC VALVE: Status: ACTIVE | Noted: 2019-03-06

## 2019-06-04 ENCOUNTER — HOSPITAL ENCOUNTER (INPATIENT)
Age: 59
LOS: 3 days | Discharge: HOME | DRG: 201 | End: 2019-06-07
Attending: INTERNAL MEDICINE | Admitting: INTERNAL MEDICINE
Payer: MEDICAID

## 2019-06-04 PROBLEM — I48.91 ATRIAL FIBRILLATION WITH RVR (HCC): Status: ACTIVE | Noted: 2019-06-04

## 2019-06-04 LAB
ANION GAP SERPL CALC-SCNC: 7 MMOL/L (ref 7–16)
ATRIAL RATE: 136 BPM
BUN SERPL-MCNC: 15 MG/DL (ref 6–23)
CALCIUM SERPL-MCNC: 9.2 MG/DL (ref 8.3–10.4)
CALCULATED R AXIS, ECG10: 19 DEGREES
CALCULATED T AXIS, ECG11: 37 DEGREES
CHLORIDE SERPL-SCNC: 107 MMOL/L (ref 98–107)
CO2 SERPL-SCNC: 27 MMOL/L (ref 21–32)
CREAT SERPL-MCNC: 1.08 MG/DL (ref 0.8–1.5)
DIAGNOSIS, 93000: NORMAL
GLUCOSE SERPL-MCNC: 112 MG/DL (ref 65–100)
MAGNESIUM SERPL-MCNC: 2.4 MG/DL (ref 1.8–2.4)
POTASSIUM SERPL-SCNC: 3.6 MMOL/L (ref 3.5–5.1)
Q-T INTERVAL, ECG07: 336 MS
QRS DURATION, ECG06: 96 MS
QTC CALCULATION (BEZET), ECG08: 478 MS
SODIUM SERPL-SCNC: 141 MMOL/L (ref 136–145)
VENTRICULAR RATE, ECG03: 122 BPM

## 2019-06-04 PROCEDURE — 36415 COLL VENOUS BLD VENIPUNCTURE: CPT

## 2019-06-04 PROCEDURE — 80048 BASIC METABOLIC PNL TOTAL CA: CPT

## 2019-06-04 PROCEDURE — 83735 ASSAY OF MAGNESIUM: CPT

## 2019-06-04 PROCEDURE — 93005 ELECTROCARDIOGRAM TRACING: CPT | Performed by: INTERNAL MEDICINE

## 2019-06-04 PROCEDURE — 65660000000 HC RM CCU STEPDOWN

## 2019-06-04 PROCEDURE — 74011000250 HC RX REV CODE- 250: Performed by: NURSE PRACTITIONER

## 2019-06-04 PROCEDURE — 74011250637 HC RX REV CODE- 250/637: Performed by: NURSE PRACTITIONER

## 2019-06-04 PROCEDURE — 93005 ELECTROCARDIOGRAM TRACING: CPT | Performed by: NURSE PRACTITIONER

## 2019-06-04 RX ORDER — ASPIRIN 325 MG
325 TABLET ORAL DAILY
Status: ON HOLD | COMMUNITY
End: 2019-06-04 | Stop reason: ALTCHOICE

## 2019-06-04 RX ORDER — SODIUM CHLORIDE 0.9 % (FLUSH) 0.9 %
5-40 SYRINGE (ML) INJECTION EVERY 8 HOURS
Status: DISCONTINUED | OUTPATIENT
Start: 2019-06-04 | End: 2019-06-07 | Stop reason: HOSPADM

## 2019-06-04 RX ORDER — SODIUM CHLORIDE 0.9 % (FLUSH) 0.9 %
5-40 SYRINGE (ML) INJECTION AS NEEDED
Status: DISCONTINUED | OUTPATIENT
Start: 2019-06-04 | End: 2019-06-07 | Stop reason: HOSPADM

## 2019-06-04 RX ORDER — SOTALOL HYDROCHLORIDE 80 MG/1
160 TABLET ORAL EVERY 12 HOURS
Status: DISCONTINUED | OUTPATIENT
Start: 2019-06-04 | End: 2019-06-07 | Stop reason: HOSPADM

## 2019-06-04 RX ORDER — LORAZEPAM 0.5 MG/1
0.5 TABLET ORAL
Status: DISCONTINUED | OUTPATIENT
Start: 2019-06-04 | End: 2019-06-07 | Stop reason: HOSPADM

## 2019-06-04 RX ORDER — DILTIAZEM HYDROCHLORIDE 5 MG/ML
10 INJECTION INTRAVENOUS ONCE
Status: COMPLETED | OUTPATIENT
Start: 2019-06-04 | End: 2019-06-04

## 2019-06-04 RX ORDER — ESCITALOPRAM OXALATE 10 MG/1
20 TABLET ORAL DAILY
Status: DISCONTINUED | OUTPATIENT
Start: 2019-06-05 | End: 2019-06-07 | Stop reason: HOSPADM

## 2019-06-04 RX ORDER — LISINOPRIL 5 MG/1
10 TABLET ORAL DAILY
Status: DISCONTINUED | OUTPATIENT
Start: 2019-06-05 | End: 2019-06-07 | Stop reason: HOSPADM

## 2019-06-04 RX ORDER — PANTOPRAZOLE SODIUM 40 MG/1
40 TABLET, DELAYED RELEASE ORAL
Status: DISCONTINUED | OUTPATIENT
Start: 2019-06-05 | End: 2019-06-07 | Stop reason: HOSPADM

## 2019-06-04 RX ORDER — ESCITALOPRAM OXALATE 20 MG/1
20 TABLET ORAL DAILY
COMMUNITY

## 2019-06-04 RX ORDER — GUAIFENESIN 100 MG/5ML
81 LIQUID (ML) ORAL DAILY
Status: DISCONTINUED | OUTPATIENT
Start: 2019-06-05 | End: 2019-06-07 | Stop reason: HOSPADM

## 2019-06-04 RX ORDER — MORPHINE SULFATE 2 MG/ML
2 INJECTION, SOLUTION INTRAMUSCULAR; INTRAVENOUS
Status: DISCONTINUED | OUTPATIENT
Start: 2019-06-04 | End: 2019-06-07 | Stop reason: HOSPADM

## 2019-06-04 RX ORDER — NITROGLYCERIN 0.4 MG/1
0.4 TABLET SUBLINGUAL
Status: DISCONTINUED | OUTPATIENT
Start: 2019-06-04 | End: 2019-06-07 | Stop reason: HOSPADM

## 2019-06-04 RX ADMIN — DILTIAZEM HYDROCHLORIDE 10 MG: 5 INJECTION INTRAVENOUS at 17:55

## 2019-06-04 RX ADMIN — NITROGLYCERIN 1 INCH: 20 OINTMENT TOPICAL at 17:24

## 2019-06-04 RX ADMIN — Medication 5 ML: at 21:30

## 2019-06-04 RX ADMIN — RIVAROXABAN 20 MG: 20 TABLET, FILM COATED ORAL at 17:22

## 2019-06-04 RX ADMIN — LORAZEPAM 0.5 MG: 0.5 TABLET ORAL at 14:44

## 2019-06-04 RX ADMIN — Medication 10 ML: at 14:44

## 2019-06-04 RX ADMIN — SOTALOL HYDROCHLORIDE 160 MG: 80 TABLET ORAL at 20:24

## 2019-06-04 NOTE — PROGRESS NOTES
Admission skin assessment reveals no wounds or pressure injury     06/04/19 1345   Skin Integumentary   Skin Integumentary (WDL) X    Pressure  Injury Documentation No Pressure Injury Noted-Pressure Ulcer Prevention Initiated   Skin Color Appropriate for ethnicity   Skin Condition/Temp Warm;Dry   Skin Integrity Scars (comment)  (lateral scar right shin)   Turgor Non-tenting   Hair Growth Present   Varicosities Absent

## 2019-06-04 NOTE — PROGRESS NOTES
Bedside and Verbal report given to self by Missael Rosenberg RN. Report included SBAR, Kardex, ED Summary, Procedure Summary, Intake and Output and Cardiac Rhythm a-fib 90s-130s.

## 2019-06-04 NOTE — PROGRESS NOTES
Bedside and Verbal shift change report given to Dewayne Watson RN (oncoming nurse) by self (offgoing nurse). Report included the following information SBAR, Kardex, MAR and Recent Results.

## 2019-06-04 NOTE — H&P
[facility] Cardiology H&P    Admitting Cardiologist:Dr. Jaida Doty     Primary Cardiologist:Dr. Sruthi Adorno     Primary Care Physician:Dr Marilou Reddy    Subjective:     Diogenes Raman is a 63 y.o. male with prior afib ablation in Feb 2019. He has known hx of HTN, mild AS, hypothyroidism. He presented to [facility] on 6/1 with nausea and vomiting. He was noted to have elevated lactic acid, no fever or leukocytosis. He was noted to be in afib with RVR. Evaluation there revealed normal abdominal CT. He was placed on IV cardizem for rate control, no anticoagulation. He was also started on sotalol and increased to 120 mg BID this am. HE is accepted in transfer for further evaluation and treatment of his afib. He does continue to use marijuana, and drinks approximately 6 + beers a week. Review of data from [facility] noted improved/ normalize lactic acid with IVF. Normal troponin levels. CBC and BMP normal. TSH. He arrives to [facility] with afib  BPM. He feels that he has been out of rhythm for probably last two weeks. Past Medical History:   Diagnosis Date    AF (atrial fibrillation) ([address] 1/2/2010    Aortic valve stenosis 12/28/2015    Dyspnea 12/28/2015    Essential hypertension 12/28/2015    Hypertension     Hyperthyroidism     hypothyroid    Nausea & vomiting     Other ill-defined conditions(799.89)     fx left ankle Jun 2009    Other unknown and unspecified cause of morbidity or mortality     afib    Palpitations 12/28/2015    Psychiatric disorder     Thyroid disease     hypothyroidism       Past Surgical History:   Procedure Laterality Date    ABDOMEN SURGERY PROC UNLISTED      fistula repair, colon    HX HEENT      tonsilectomy     HX ORTHOPAEDIC      hardware in R ankle. No current facility-administered medications for this encounter.       Allergies   Allergen Reactions    Morphine Nausea and Vomiting      Social History     Tobacco Use    Smoking status: Current Some Day Smoker    Smokeless tobacco: Current User    Tobacco comment: occassionally cigars   Substance Use Topics    Alcohol use: Yes     Comment: soc      No family history on file. Review of Systems  Gen: Denies fever, chills, malaise or fatigue. Appetite good. HEENT: Denies frequent headaches, dizzyness, visual disturbances, Neck pain or swallowing difficulty  Lungs: Denies shortness of breath, hx of COPD, breathing problems  Cardiovascular: as above   GI: Denies hememesis, dark tarry stools, No prior Hx of GI bleed, Denies constipation  : Denies dysuria, no complaints of frequency, nocturia  Heme: No prior bleeding disorders, no prior Cancer  Neuro: Denies prior CVA, TIA. Endocrine: no diabetes, thyroid disorders  Psychiatric: Denies anxiety, or other psychiatric illnesses. Objective:     Visit Vitals  /90   Pulse 96   Temp 98 °F (36.7 °C)   Resp 18   Ht 6' 2\" (1.88 m)   Wt 120.6 kg (265 lb 14.4 oz) Comment: wearing shoes   SpO2 96%   BMI 34.14 kg/m²     General:Alert, cooperative, no distress, appears stated age  Head: Normocephalic, without obvious abnormality, atraumatic. Eyes: Conjunctivae/corneas clear. PERRL, EOMs intact  Nose:Nares normal. Septum midline. Mucosa normal. No drainage or sinus tenderness. Throat: Lips, mucosa, and tongue normal. Teeth and gums normal.   Neck: Supple, symmetrical, trachea midline,  no carotid bruit and no JVD. Lungs:Clear to auscultation bilaterally. Chest wall: No tenderness or deformity. Heart: tachycardic, irregular  Abdomen:Soft, non-tender. Bowel sounds normal. No masses, No organomegaly. Extremities: Extremities normal, atraumatic, no cyanosis or edema. Pulses: 2+ and symmetric all extremities.     Skin: Skin color, texture, turgor normal. No rashes or lesions  Lymph nodes: Cervical, supraclavicular, and axillary nodes normal  Neurologic:No focal deficits identified                 ECG: afib RVR     Data Review:     As above, awaiting echo results not included in paper chart. Assessment / Plan     Principal Problem:    AF (atrial fibrillation) (HCC) (1/2/2010)--not currently on oral anticoagulation with recurrence and uncontrolled rate now. Previously on flecanide 100 mg bid,  mg. He was initially on eliquis after ablation but switched to free Xarelto samples for 6 weeks after ablation. He had difficulty affording his medications in past. He drinks significant amount of beer a week and continues to smoke marijuana. Will discuss with Dr. Prachi Zabala oral anticoagulation and antiarrhythmia therapy. Pt was started on sotalol 80 mg BID for few doses and then increased today at [facility] to 120 mg --He will need to be monitored closely with total of 6 doses and monitor QTc. Active Problems: Aortic valve stenosis (12/28/2015)      Hypertension (8/22/2016)    Mariah Green NP     Attending Addendum    Patient independently seen and examined by me. Agree with above note by physician extender with the following additions and exceptions: 63 y.o. male with prior afib ablation in Feb 2019, HTN, mild AS, hypothyroidism, who was transferred from OSH after presenting 6/1 with nausea and vomiting and transferred with afib with RVR. Key findings are:  No CP or MERCHANT  CV- irreg irreg, distant S1, S2  Lungs- Clear bilaterally  Ext- no edema    Plan: discussed options including MOSES/DCCV, MOSES/DCCV with sotalol loading, alternative antiarrhythmic therapy, and repeat ablation, after discussion with plan for sotalol loading, ECGs and telemetry per protocol, repeat echo, initiate xarelto 20mg with dinner, further plan as noted above and pending clinical course    Zoe Zapien [facility] Cardiology

## 2019-06-04 NOTE — PROGRESS NOTES
Problem: Afib Pathway: Day 1  Goal: Off Pathway (Use only if patient is Off Pathway)  Outcome: Progressing Towards Goal  Goal: Activity/Safety  Outcome: Progressing Towards Goal  Goal: Consults, if ordered  Outcome: Progressing Towards Goal  Goal: Diagnostic Test/Procedures  Outcome: Progressing Towards Goal  Goal: Nutrition/Diet  Outcome: Progressing Towards Goal  Goal: Discharge Planning  Outcome: Progressing Towards Goal  Goal: Medications  Outcome: Progressing Towards Goal  Goal: Respiratory  Outcome: Progressing Towards Goal  Goal: Treatments/Interventions/Procedures  Outcome: Progressing Towards Goal  Goal: Psychosocial  Outcome: Progressing Towards Goal  Goal: *Optimal pain control at patient's stated goal  Outcome: Progressing Towards Goal  Goal: *Hemodynamically stable  Outcome: Progressing Towards Goal  Goal: *Stable cardiac rhythm  Outcome: Progressing Towards Goal  Goal: *Lungs clear or at baseline  Outcome: Progressing Towards Goal  Goal: *Labs within defined limits  Outcome: Progressing Towards Goal  Goal: *Describes available resources and support systems  Outcome: Progressing Towards Goal     Problem: Falls - Risk of  Goal: *Absence of Falls  Description  Document Rhona Harp Fall Risk and appropriate interventions in the flowsheet. Outcome: Progressing Towards Goal  Note:   Fall Risk Interventions:  Medication Interventions: Teach patient to arise slowly, Patient to call before getting OOB  History of Falls Interventions: Vital signs minimum Q4HRs X 24 hrs (comment for end date)  Pt with lilo of 2. Walks with steady gait and without need for assistance. Hx of fall on [facility] [employer] due to alcohol consumption. Pt stated he hit his head during this fall. Educated pt to arise slowly, wear footwear with  and call for assistance prior to arising. Call light in reach at all times.

## 2019-06-04 NOTE — PROGRESS NOTES
TRANSFER - IN REPORT:    Verbal report received from [facility], [address] [facility] on [address] being received from [facility] for routine progression of care      Report consisted of patients Situation, Background, Assessment and Recommendations(SBAR). Information from the following report(s) SBAR, Kardex, MAR, Recent Results and Cardiac Rhythm afib RVR was reviewed with the receiving nurse. Opportunity for questions and clarification was provided.

## 2019-06-04 NOTE — PROGRESS NOTES
At 1710, pt sustaining . Pt had used used BR and ambulated in room. Per Becky Muñoz NP, wait 30 mins and if HR still persists give 10 mg Cardizem IV. At [phone], pt still sustained  so 10 mg Cardizem given. Pt tolerated well with stable pressures and HR now controlled at 96. Will continue to monitor.

## 2019-06-05 LAB
ANION GAP SERPL CALC-SCNC: 7 MMOL/L (ref 7–16)
ATRIAL RATE: 293 BPM
ATRIAL RATE: 326 BPM
ATRIAL RATE: 61 BPM
BUN SERPL-MCNC: 16 MG/DL (ref 6–23)
CALCIUM SERPL-MCNC: 9 MG/DL (ref 8.3–10.4)
CALCULATED P AXIS, ECG09: -87 DEGREES
CALCULATED P AXIS, ECG09: 45 DEGREES
CALCULATED P AXIS, ECG09: 86 DEGREES
CALCULATED R AXIS, ECG10: 20 DEGREES
CALCULATED R AXIS, ECG10: 22 DEGREES
CALCULATED R AXIS, ECG10: 47 DEGREES
CALCULATED T AXIS, ECG11: 26 DEGREES
CALCULATED T AXIS, ECG11: 41 DEGREES
CALCULATED T AXIS, ECG11: 53 DEGREES
CHLORIDE SERPL-SCNC: 108 MMOL/L (ref 98–107)
CO2 SERPL-SCNC: 25 MMOL/L (ref 21–32)
CREAT SERPL-MCNC: 1.12 MG/DL (ref 0.8–1.5)
DIAGNOSIS, 93000: NORMAL
GLUCOSE SERPL-MCNC: 117 MG/DL (ref 65–100)
MAGNESIUM SERPL-MCNC: 2.3 MG/DL (ref 1.8–2.4)
P-R INTERVAL, ECG05: 160 MS
POTASSIUM SERPL-SCNC: 3.9 MMOL/L (ref 3.5–5.1)
Q-T INTERVAL, ECG07: 354 MS
Q-T INTERVAL, ECG07: 394 MS
Q-T INTERVAL, ECG07: 474 MS
QRS DURATION, ECG06: 108 MS
QRS DURATION, ECG06: 108 MS
QRS DURATION, ECG06: 92 MS
QTC CALCULATION (BEZET), ECG08: 470 MS
QTC CALCULATION (BEZET), ECG08: 474 MS
QTC CALCULATION (BEZET), ECG08: 477 MS
SODIUM SERPL-SCNC: 140 MMOL/L (ref 136–145)
VENTRICULAR RATE, ECG03: 106 BPM
VENTRICULAR RATE, ECG03: 61 BPM
VENTRICULAR RATE, ECG03: 87 BPM

## 2019-06-05 PROCEDURE — 93306 TTE W/DOPPLER COMPLETE: CPT

## 2019-06-05 PROCEDURE — 93312 ECHO TRANSESOPHAGEAL: CPT

## 2019-06-05 PROCEDURE — 93005 ELECTROCARDIOGRAM TRACING: CPT | Performed by: INTERNAL MEDICINE

## 2019-06-05 PROCEDURE — 5A2204Z RESTORATION OF CARDIAC RHYTHM, SINGLE: ICD-10-PCS | Performed by: INTERNAL MEDICINE

## 2019-06-05 PROCEDURE — 77030009397 HC ELECTRD ECG PACE ZOLL -B

## 2019-06-05 PROCEDURE — 99152 MOD SED SAME PHYS/QHP 5/>YRS: CPT

## 2019-06-05 PROCEDURE — 83735 ASSAY OF MAGNESIUM: CPT

## 2019-06-05 PROCEDURE — 36415 COLL VENOUS BLD VENIPUNCTURE: CPT

## 2019-06-05 PROCEDURE — 74011250637 HC RX REV CODE- 250/637: Performed by: NURSE PRACTITIONER

## 2019-06-05 PROCEDURE — 74011250636 HC RX REV CODE- 250/636

## 2019-06-05 PROCEDURE — 74011000250 HC RX REV CODE- 250: Performed by: NURSE PRACTITIONER

## 2019-06-05 PROCEDURE — 80048 BASIC METABOLIC PNL TOTAL CA: CPT

## 2019-06-05 PROCEDURE — 92960 CARDIOVERSION ELECTRIC EXT: CPT

## 2019-06-05 PROCEDURE — 65660000000 HC RM CCU STEPDOWN

## 2019-06-05 RX ORDER — DILTIAZEM HYDROCHLORIDE 5 MG/ML
10 INJECTION INTRAVENOUS
Status: COMPLETED | OUTPATIENT
Start: 2019-06-05 | End: 2019-06-05

## 2019-06-05 RX ORDER — DILTIAZEM HYDROCHLORIDE 5 MG/ML
5 INJECTION INTRAVENOUS ONCE
Status: COMPLETED | OUTPATIENT
Start: 2019-06-05 | End: 2019-06-05

## 2019-06-05 RX ORDER — FENTANYL CITRATE 50 UG/ML
25-100 INJECTION, SOLUTION INTRAMUSCULAR; INTRAVENOUS
Status: DISCONTINUED | OUTPATIENT
Start: 2019-06-05 | End: 2019-06-06 | Stop reason: SDUPTHER

## 2019-06-05 RX ORDER — DILTIAZEM HYDROCHLORIDE 5 MG/ML
INJECTION INTRAVENOUS
Status: ACTIVE
Start: 2019-06-05 | End: 2019-06-05

## 2019-06-05 RX ORDER — MIDAZOLAM HYDROCHLORIDE 1 MG/ML
.5-5 INJECTION, SOLUTION INTRAMUSCULAR; INTRAVENOUS
Status: DISCONTINUED | OUTPATIENT
Start: 2019-06-05 | End: 2019-06-06 | Stop reason: SDUPTHER

## 2019-06-05 RX ORDER — ACETAMINOPHEN 325 MG/1
650 TABLET ORAL
Status: DISCONTINUED | OUTPATIENT
Start: 2019-06-05 | End: 2019-06-07 | Stop reason: HOSPADM

## 2019-06-05 RX ADMIN — DILTIAZEM HYDROCHLORIDE 5 MG: 5 INJECTION INTRAVENOUS at 01:44

## 2019-06-05 RX ADMIN — Medication 10 ML: at 08:09

## 2019-06-05 RX ADMIN — DILTIAZEM HYDROCHLORIDE 10 MG: 5 INJECTION INTRAVENOUS at 06:15

## 2019-06-05 RX ADMIN — NITROGLYCERIN 1 INCH: 20 OINTMENT TOPICAL at 12:03

## 2019-06-05 RX ADMIN — Medication 5 ML: at 22:25

## 2019-06-05 RX ADMIN — Medication 5 ML: at 06:15

## 2019-06-05 RX ADMIN — PANTOPRAZOLE SODIUM 40 MG: 40 TABLET, DELAYED RELEASE ORAL at 06:18

## 2019-06-05 RX ADMIN — MIDAZOLAM HYDROCHLORIDE 2 MG: 1 INJECTION, SOLUTION INTRAMUSCULAR; INTRAVENOUS at 13:16

## 2019-06-05 RX ADMIN — LISINOPRIL 10 MG: 5 TABLET ORAL at 08:07

## 2019-06-05 RX ADMIN — LORAZEPAM 0.5 MG: 0.5 TABLET ORAL at 22:25

## 2019-06-05 RX ADMIN — MIDAZOLAM HYDROCHLORIDE 2 MG: 1 INJECTION, SOLUTION INTRAMUSCULAR; INTRAVENOUS at 13:12

## 2019-06-05 RX ADMIN — SOTALOL HYDROCHLORIDE 160 MG: 80 TABLET ORAL at 20:18

## 2019-06-05 RX ADMIN — MIDAZOLAM HYDROCHLORIDE 2 MG: 1 INJECTION, SOLUTION INTRAMUSCULAR; INTRAVENOUS at 13:08

## 2019-06-05 RX ADMIN — MIDAZOLAM HYDROCHLORIDE 2 MG: 1 INJECTION, SOLUTION INTRAMUSCULAR; INTRAVENOUS at 13:10

## 2019-06-05 RX ADMIN — LEVOTHYROXINE SODIUM 137 MCG: 50 TABLET ORAL at 06:18

## 2019-06-05 RX ADMIN — ASPIRIN 81 MG 81 MG: 81 TABLET ORAL at 08:07

## 2019-06-05 RX ADMIN — LORAZEPAM 0.5 MG: 0.5 TABLET ORAL at 10:30

## 2019-06-05 RX ADMIN — FENTANYL CITRATE 25 MCG: 50 INJECTION, SOLUTION INTRAMUSCULAR; INTRAVENOUS at 13:12

## 2019-06-05 RX ADMIN — FENTANYL CITRATE 50 MCG: 50 INJECTION, SOLUTION INTRAMUSCULAR; INTRAVENOUS at 13:08

## 2019-06-05 RX ADMIN — SOTALOL HYDROCHLORIDE 160 MG: 80 TABLET ORAL at 08:07

## 2019-06-05 RX ADMIN — MIDAZOLAM HYDROCHLORIDE 2 MG: 1 INJECTION, SOLUTION INTRAMUSCULAR; INTRAVENOUS at 13:21

## 2019-06-05 RX ADMIN — NITROGLYCERIN 1 INCH: 20 OINTMENT TOPICAL at 06:15

## 2019-06-05 RX ADMIN — FENTANYL CITRATE 25 MCG: 50 INJECTION, SOLUTION INTRAMUSCULAR; INTRAVENOUS at 13:34

## 2019-06-05 RX ADMIN — RIVAROXABAN 20 MG: 20 TABLET, FILM COATED ORAL at 17:00

## 2019-06-05 RX ADMIN — NITROGLYCERIN 1 INCH: 20 OINTMENT TOPICAL at 00:56

## 2019-06-05 RX ADMIN — ACETAMINOPHEN 650 MG: 325 TABLET, FILM COATED ORAL at 06:56

## 2019-06-05 RX ADMIN — MIDAZOLAM HYDROCHLORIDE 2 MG: 1 INJECTION, SOLUTION INTRAMUSCULAR; INTRAVENOUS at 13:14

## 2019-06-05 RX ADMIN — ESCITALOPRAM OXALATE 20 MG: 10 TABLET ORAL at 08:07

## 2019-06-05 NOTE — PROGRESS NOTES
Verbal bedside report given to saeid Keating RN. Patient's situation, background, assessment and recommendations provided. Kardex, Mar, and recent results also reviewed. Opportunity for questions provided. Oncoming RN assumed care of patient.

## 2019-06-05 NOTE — PROGRESS NOTES
TRANSFER - IN REPORT:    Verbal report received from Jen Miller RN on Sarah Marquezr being received from EP [facility] for routine post - op      Report consisted of patients Situation, Background, Assessment and Recommendations(SBAR). Information from the following report(s) SBAR, Kardex and Procedure Summary was reviewed with the receiving nurse. Opportunity for questions and clarification was provided. Assessment completed upon patients arrival to unit and care assumed.

## 2019-06-05 NOTE — PROGRESS NOTES
After patient's heart rate had come down to 80s-90s during the 2200 hour, HR at the midnight VS and reassessment again running 120s-140s. MEWS4. Araceli Baird NP notified and telephone orders with read back to push 5mg IV Diltiazem now. Charge nurse, Carlos Martinez RN updated. Will continue to monitor.

## 2019-06-05 NOTE — PROGRESS NOTES
Problem: Afib Pathway: Day 1  Goal: *Labs within defined limits  Outcome: Progressing Towards Goal  Goal: *Describes available resources and support systems  Outcome: Progressing Towards Goal     Problem: Falls - Risk of  Goal: *Absence of Falls  Description  Document [place] Fall Risk and appropriate interventions in the flowsheet. Outcome: Progressing Towards Goal  Note:   Fall Risk Interventions:        Patient progressing towards goal with no falls on current admission. Patient without confusion, agitation, or sensory perception deficits. Patient has steady gait on ambulation. Personal belongings are within reach. Bed is in the low and locked position with side rails up x2. Yellow gripper socks to bilateral feet. Call light within reach and patient verbalizes understanding of use. Medication Interventions: Patient to call before getting OOB, Teach patient to arise slowly         History of Falls Interventions: Investigate reason for fall, Room close to nurse's station         Problem: Afib Pathway: Day 1  Goal: *Hemodynamically stable  Outcome: Not Progressing Towards Goal  Note:   Patient's heart rate has been 120s-150s, but patient asymptomatic. Goal: *Stable cardiac rhythm  Outcome: Not Progressing Towards Goal  Goal: *Lungs clear or at baseline  Outcome: Not Progressing Towards Goal  Note:   Bilateral lungs are coarse with scattered wheezing.         Problem: Afib Pathway: Day 1  Goal: Activity/Safety  Outcome: Resolved/Met  Goal: Consults, if ordered  Outcome: Resolved/Met  Goal: Diagnostic Test/Procedures  Outcome: Resolved/Met  Goal: Nutrition/Diet  Outcome: Resolved/Met  Goal: Discharge Planning  Outcome: Resolved/Met  Goal: Medications  Outcome: Resolved/Met  Goal: Respiratory  Outcome: Resolved/Met  Goal: Treatments/Interventions/Procedures  Outcome: Resolved/Met  Goal: Psychosocial  Outcome: Resolved/Met  Goal: *Optimal pain control at patient's stated goal  Outcome: Resolved/Met  Note: Patient has denied pain on current shift.

## 2019-06-05 NOTE — PROGRESS NOTES
MEWS noted to be 5 due to elevated HR. Pablo Mata NP informed and 10mg Diltiazem IVP ordered for now. No other orders at this time. Patient NPO for MOSES and cardioversion this AM.   Charge nurse Antonette Liu RN informed. Will continue to monitor.

## 2019-06-05 NOTE — PROGRESS NOTES
Pt heart rate was variable between 110s-160s when echo attempted, making proper echo results unreliable. Will attempt echo as schedule permits.

## 2019-06-05 NOTE — PROCEDURES
Pre-Procedure Diagnosis  1. Atrial fibrilllation     Procedure Performed  1. Transesophageal echocardiogram  2. Direct Current Cardioversion    Estimated Blood Loss: None, not applicable    Procedural Description: The patient was brought to the procedure room in a fasting, nonsedated state. The risks, benefits and alternatives of the procedure were reviewed with the patient, and final questions answered. Informed consent was confirmed. A procedural timeout was called and completed per institutional policy. Once appropriate monitors were applied, fentanyl and versed were given in increments of 1-2mg versed and 25 mcg fentanyl to obtain an appropriate level of conscious sedation with continuous oxygen saturation measurement and blood pressure monitoring at minimal increments of every 5 minutes. Once an appropriate level of sedation was achieved, a transesophageal echocardiogram probe was inserted into the esophagus with ease. A comprehensive MOSES study was completed and the full report available in the chart. There was no evidence of spontaneous echo contrast or thrombus in the left atrial appendage. The patient was converted to sinus rhythm with pads across the anterior and posterior chest wall. The patient awoke from his procedure without overt complications. Post Procedure Diagnosis: No JOHN thrombus, Normal sinus rhythm    Chad Almendarez MD, MS  Clinical Cardiac Electrophysiology  6/5/2019  1:24 PM

## 2019-06-05 NOTE — PROGRESS NOTES
Second dose of sotalol given this AM. EKG following reveals QTC= 470. Rhythm remaining in A fib rate very irregular and variable 70s-130s. Sometimes a flutter. Pt with no symptoms. Tolerating meds well.

## 2019-06-05 NOTE — PROGRESS NOTES
MOSES/CVN Dr Phuc Farah  ASA II Mallampati II  Versed 12mg  Fentanyl 100mcg  One shock at 200j synced  Sinus jeff  Report called to tele rn cy  Pt tolerated procedure well  Will continue to monitor

## 2019-06-05 NOTE — PROGRESS NOTES
Bedside and Verbal report given to Shavonne Elmore RN by self. Report included SBAR, Kardex, ED summary, procedure summary, recent results and cardiac rhythm a-fib 70s-100s.

## 2019-06-05 NOTE — PROGRESS NOTES
Patient's heart rate noted to be upper 120-150s, currently running at 148. Discussed with Bridget Patricia NP and no new orders received at this time. Will continue to monitor. MEWS score noted to be 3. Charge nurse, Rodrick Felty, RN informed and aware that Cardiology has been updated. High MEWS score noted due to elevated HR. Will continue to monitor.

## 2019-06-05 NOTE — PROGRESS NOTES
Verbal bedside report received from Clarisse Goins [facility]. Patient's situation, background, assessment and recommendations were provided. Kardex, Mar, and recent results also reviewed. Opportunity for questions provided. Assumed care of patient.

## 2019-06-05 NOTE — PROGRESS NOTES
Patient returned from [address] [place] [address] post MOSES/Cardioversion. Patient resting comfortably. Tele box applied. Patient awake and responds easily to voice. [place] at bedside with BPs cycling q15. Pt able to eat upon arrival. Numbed 1330.

## 2019-06-05 NOTE — PROGRESS NOTES
[facility] CARDIOLOGY PROGRESS NOTE           6/5/2019 6:41 AM    Admit Date: 6/4/2019    Admit Diagnosis: Atrial fibrillation with RVR (HCC) [I48.91]      Subjective:   No complaints this AM, no chest pain or shortness of breath    Interval History: (History of pertinent interval events obtained from nursing staff)  Remains in afib    ROS:  GEN:  No fever or chills  Cardiovascular:  As noted above  Pulmonary:  As noted above  Neuro:  No new focal motor or sensory loss      Objective:     Vitals:    06/04/19 2221 06/05/19 0044 06/05/19 0439 06/05/19 0615   BP:  (!) 127/94 100/74    Pulse: 85 (!) 145 (!) 150 (!) 148   Resp:  16 16    Temp:  97.8 °F (36.6 °C) 97.6 °F (36.4 °C)    SpO2:  94% 95%    Weight:   118.8 kg (261 lb 14.4 oz)    Height:           Physical Exam:  Catherine Sarita, Well Nourished, No Acute Distress, Alert & Oriented x 3, appropriate mood. Neck- supple, no JVD  CV- irreg irreg, no MRG  Lung- clear bilaterally  Abd- soft, nontender, nondistended  Ext- no edema bilaterally.   Skin- warm and dry    Current Facility-Administered Medications   Medication Dose Route Frequency    acetaminophen (TYLENOL) tablet 650 mg  650 mg Oral Q6H PRN    escitalopram oxalate (LEXAPRO) tablet 20 mg  20 mg Oral DAILY    levothyroxine (SYNTHROID) tablet 137 mcg  137 mcg Oral ACB    lisinopril (PRINIVIL, ZESTRIL) tablet 10 mg  10 mg Oral DAILY    LORazepam (ATIVAN) tablet 0.5 mg  0.5 mg Oral BID PRN    pantoprazole (PROTONIX) tablet 40 mg  40 mg Oral ACB    sodium chloride (NS) flush 5-40 mL  5-40 mL IntraVENous Q8H    sodium chloride (NS) flush 5-40 mL  5-40 mL IntraVENous PRN    aspirin chewable tablet 81 mg  81 mg Oral DAILY    nitroglycerin (NITROBID) 2 % ointment 1 Inch  1 Inch Topical Q6H    nitroglycerin (NITROSTAT) tablet 0.4 mg  0.4 mg SubLINGual Q5MIN PRN    morphine injection 2 mg  2 mg IntraVENous Q4H PRN    sotalol (BETAPACE) tablet 160 mg  160 mg Oral Q12H    rivaroxaban (XARELTO) tablet 20 mg  20 mg Oral DAILY WITH DINNER     Data Review:   Recent Results (from the past 24 hour(s))   EKG, 12 LEAD, INITIAL    Collection Time: 06/04/19  4:04 PM   Result Value Ref Range    Ventricular Rate 122 BPM    Atrial Rate 136 BPM    QRS Duration 96 ms    Q-T Interval 336 ms    QTC Calculation (Bezet) 478 ms    Calculated R Axis 19 degrees    Calculated T Axis 37 degrees    Diagnosis       Atrial fibrillation with rapid ventricular response  Junctional ST depression, probably normal  Abnormal ECG  When compared with ECG of 04-JUN-2019 16:03,  No significant change was found  Confirmed by ELBA VILLEGAS (), Mirlande Crawford ([id number]) on 6/4/2019 5:05:22 PM     MAGNESIUM    Collection Time: 06/04/19  7:00 PM   Result Value Ref Range    Magnesium 2.4 1.8 - 2.4 mg/dL   METABOLIC PANEL, BASIC    Collection Time: 06/04/19  7:00 PM   Result Value Ref Range    Sodium 141 136 - 145 mmol/L    Potassium 3.6 3.5 - 5.1 mmol/L    Chloride 107 98 - 107 mmol/L    CO2 27 21 - 32 mmol/L    Anion gap 7 7 - 16 mmol/L    Glucose 112 (H) 65 - 100 mg/dL    BUN 15 6 - 23 MG/DL    Creatinine 1.08 0.8 - 1.5 MG/DL    GFR est AA >60 >60 ml/min/1.73m2    GFR est non-AA >60 >60 ml/min/1.73m2    Calcium 9.2 8.3 - 10.4 MG/DL   EKG, 12 LEAD, SUBSEQUENT    Collection Time: 06/04/19 10:31 PM   Result Value Ref Range    Ventricular Rate 87 BPM    Atrial Rate 326 BPM    QRS Duration 108 ms    Q-T Interval 394 ms    QTC Calculation (Bezet) 474 ms    Calculated P Axis -87 degrees    Calculated R Axis 20 degrees    Calculated T Axis 26 degrees    Diagnosis       Atrial flutter with variable A-V block with premature ventricular or   aberrantly conducted complexes  Abnormal ECG  When compared with ECG of 04-JUN-2019 16:04,  Atrial flutter has replaced Atrial fibrillation     METABOLIC PANEL, BASIC    Collection Time: 06/05/19  4:50 AM   Result Value Ref Range    Sodium 140 136 - 145 mmol/L    Potassium 3.9 3.5 - 5.1 mmol/L    Chloride 108 (H) 98 - 107 mmol/L    CO2 25 21 - 32 mmol/L    Anion gap 7 7 - 16 mmol/L    Glucose 117 (H) 65 - 100 mg/dL    BUN 16 6 - 23 MG/DL    Creatinine 1.12 0.8 - 1.5 MG/DL    GFR est AA >60 >60 ml/min/1.73m2    GFR est non-AA >60 >60 ml/min/1.73m2    Calcium 9.0 8.3 - 10.4 MG/DL   MAGNESIUM    Collection Time: 06/05/19  4:50 AM   Result Value Ref Range    Magnesium 2.3 1.8 - 2.4 mg/dL       EKG:  (EKG has been independently visualized by me with interpretation below)  Assessment:     Principal Problem:    AF (atrial fibrillation) ([address] (1/2/2010)    Active Problems: Aortic valve stenosis (12/28/2015)      Hypertension (8/22/2016)      Atrial fibrillation with RVR ([address] (6/4/2019)      Plan:   1. Afib: plan for MOSES guided DCCV today, cont sotalol loading, xarelto, echo today  2. Sotalol: post dose ECGs and telemetry per protocol  3. Aortic stenosis: repeat echo  4. CVA protection: Bob Almendarez MD  Cardiology/Electrophysiology

## 2019-06-05 NOTE — PROGRESS NOTES
Care Management Interventions  PCP Verified by CM: Yes  Last Visit to PCP: 02/17/19  Mode of Transport at Discharge: Other (see comment)(Connie Florez Girlfriend [phone] )  Transition of Care Consult (CM Consult): Discharge Planning  Discharge Durable Medical Equipment: No  Physical Therapy Consult: No  Occupational Therapy Consult: No  Speech Therapy Consult: No  Current Support Network: Lives Alone, Own Home  Confirm Follow Up Transport: Self  Plan discussed with Pt/Family/Caregiver: Yes  Freedom of Choice Offered: Yes  Discharge Location  Discharge Placement: Home    Pt admitted to 3rd floor [facility] for afib . CM met with pt to discuss CM needs & DCP. Pt is A&Ox4. Pt is indep at home with all ADLS. Pt lives alone. Pt has no DME needs. Pt has no difficulty with obtaining medications or transport. Pt works and pays for medications out of pocket. Pt jordan be started on Beta pace- Generic cost < $10. DCP home. No further needs noted. CM to continue to monitor.

## 2019-06-06 LAB
ANION GAP SERPL CALC-SCNC: 8 MMOL/L (ref 7–16)
ATRIAL RATE: 58 BPM
ATRIAL RATE: 69 BPM
BUN SERPL-MCNC: 26 MG/DL (ref 6–23)
CALCIUM SERPL-MCNC: 9 MG/DL (ref 8.3–10.4)
CALCULATED P AXIS, ECG09: 55 DEGREES
CALCULATED P AXIS, ECG09: 56 DEGREES
CALCULATED R AXIS, ECG10: 22 DEGREES
CALCULATED R AXIS, ECG10: 58 DEGREES
CALCULATED T AXIS, ECG11: 54 DEGREES
CALCULATED T AXIS, ECG11: 64 DEGREES
CHLORIDE SERPL-SCNC: 106 MMOL/L (ref 98–107)
CO2 SERPL-SCNC: 26 MMOL/L (ref 21–32)
CREAT SERPL-MCNC: 1.21 MG/DL (ref 0.8–1.5)
DIAGNOSIS, 93000: NORMAL
DIAGNOSIS, 93000: NORMAL
GLUCOSE SERPL-MCNC: 110 MG/DL (ref 65–100)
MAGNESIUM SERPL-MCNC: 2.7 MG/DL (ref 1.8–2.4)
P-R INTERVAL, ECG05: 152 MS
P-R INTERVAL, ECG05: 162 MS
POTASSIUM SERPL-SCNC: 3.9 MMOL/L (ref 3.5–5.1)
Q-T INTERVAL, ECG07: 450 MS
Q-T INTERVAL, ECG07: 488 MS
QRS DURATION, ECG06: 110 MS
QRS DURATION, ECG06: 116 MS
QTC CALCULATION (BEZET), ECG08: 479 MS
QTC CALCULATION (BEZET), ECG08: 482 MS
SODIUM SERPL-SCNC: 140 MMOL/L (ref 136–145)
VENTRICULAR RATE, ECG03: 58 BPM
VENTRICULAR RATE, ECG03: 69 BPM

## 2019-06-06 PROCEDURE — 83735 ASSAY OF MAGNESIUM: CPT

## 2019-06-06 PROCEDURE — 93005 ELECTROCARDIOGRAM TRACING: CPT | Performed by: INTERNAL MEDICINE

## 2019-06-06 PROCEDURE — 74011250637 HC RX REV CODE- 250/637: Performed by: NURSE PRACTITIONER

## 2019-06-06 PROCEDURE — 80048 BASIC METABOLIC PNL TOTAL CA: CPT

## 2019-06-06 PROCEDURE — 36415 COLL VENOUS BLD VENIPUNCTURE: CPT

## 2019-06-06 PROCEDURE — 65660000000 HC RM CCU STEPDOWN

## 2019-06-06 RX ADMIN — SOTALOL HYDROCHLORIDE 160 MG: 80 TABLET ORAL at 20:15

## 2019-06-06 RX ADMIN — PANTOPRAZOLE SODIUM 40 MG: 40 TABLET, DELAYED RELEASE ORAL at 05:24

## 2019-06-06 RX ADMIN — LORAZEPAM 0.5 MG: 0.5 TABLET ORAL at 23:58

## 2019-06-06 RX ADMIN — ASPIRIN 81 MG 81 MG: 81 TABLET ORAL at 09:51

## 2019-06-06 RX ADMIN — Medication 10 ML: at 13:23

## 2019-06-06 RX ADMIN — LEVOTHYROXINE SODIUM 137 MCG: 50 TABLET ORAL at 07:47

## 2019-06-06 RX ADMIN — SOTALOL HYDROCHLORIDE 160 MG: 80 TABLET ORAL at 07:46

## 2019-06-06 RX ADMIN — RIVAROXABAN 20 MG: 20 TABLET, FILM COATED ORAL at 17:20

## 2019-06-06 RX ADMIN — LISINOPRIL 10 MG: 5 TABLET ORAL at 09:51

## 2019-06-06 RX ADMIN — LORAZEPAM 0.5 MG: 0.5 TABLET ORAL at 15:59

## 2019-06-06 RX ADMIN — Medication 5 ML: at 20:17

## 2019-06-06 RX ADMIN — Medication 5 ML: at 05:24

## 2019-06-06 RX ADMIN — ESCITALOPRAM OXALATE 20 MG: 10 TABLET ORAL at 09:51

## 2019-06-06 NOTE — PROGRESS NOTES
Pt in SR post procedure. Problem: Afib Pathway: Day 3  Goal: Off Pathway (Use only if patient is Off Pathway)  Outcome: Progressing Towards Goal  Goal: Activity/Safety  Outcome: Progressing Towards Goal  Goal: Diagnostic Test/Procedures  Outcome: Progressing Towards Goal  Goal: Nutrition/Diet  Outcome: Progressing Towards Goal  Goal: Discharge Planning  Outcome: Progressing Towards Goal  Goal: Medications  Outcome: Progressing Towards Goal  Goal: Respiratory  Outcome: Progressing Towards Goal  Goal: Treatments/Interventions/Procedures  Outcome: Progressing Towards Goal  Goal: Psychosocial  Outcome: Progressing Towards Goal    Pt wearing gripper socks. Pt educated on importance to call for assistance. Problem: Falls - Risk of  Goal: *Absence of Falls  Description  Document Sara Peres Fall Risk and appropriate interventions in the flowsheet.   Outcome: Progressing Towards Goal

## 2019-06-06 NOTE — PROGRESS NOTES
Verbal bedside report received from [facility], [facility]. Patient's situation, background, assessment and recommendations were provided. Kardex, Mar, and recent results also reviewed. Opportunity for questions provided. Assumed care of patient.

## 2019-06-06 NOTE — PROGRESS NOTES
[facility] CARDIOLOGY PROGRESS NOTE           6/6/2019 6:30 AM    Admit Date: 6/4/2019    Admit Diagnosis: Atrial fibrillation with RVR (HCC) [I48.91]      Subjective:   No complaints this AM, no chest pain or shortness of breath    Interval History: (History of pertinent interval events obtained from nursing staff)  S/p MOSES guided DCCV to NSR, no events overnight    ROS:  GEN:  No fever or chills  Cardiovascular:  As noted above  Pulmonary:  As noted above  Neuro:  No new focal motor or sensory loss      Objective:     Vitals:    06/05/19 2018 06/05/19 2051 06/06/19 0052 06/06/19 0620   BP: (!) 137/98 129/87 123/76 116/77   Pulse: 67 68 (!) 59 63   Resp:  18 18 18   Temp:  98 °F (36.7 °C) 97.6 °F (36.4 °C) 97.4 °F (36.3 °C)   SpO2:  93% 94% 97%   Weight:    119.3 kg (262 lb 14.4 oz)   Height:           Physical Exam:  General-Well Developed, Well Nourished, No Acute Distress, Alert & Oriented x 3, appropriate mood. Neck- supple, no JVD  CV- regular rate and rhythm no MRG  Lung- clear bilaterally  Abd- soft, nontender, nondistended  Ext- no edema bilaterally.   Skin- warm and dry    Current Facility-Administered Medications   Medication Dose Route Frequency    acetaminophen (TYLENOL) tablet 650 mg  650 mg Oral Q6H PRN    fentaNYL citrate (PF) injection  mcg   mcg IntraVENous Multiple    midazolam (VERSED) injection 0.5-5 mg  0.5-5 mg IntraVENous Multiple    escitalopram oxalate (LEXAPRO) tablet 20 mg  20 mg Oral DAILY    levothyroxine (SYNTHROID) tablet 137 mcg  137 mcg Oral ACB    lisinopril (PRINIVIL, ZESTRIL) tablet 10 mg  10 mg Oral DAILY    LORazepam (ATIVAN) tablet 0.5 mg  0.5 mg Oral BID PRN    pantoprazole (PROTONIX) tablet 40 mg  40 mg Oral ACB    sodium chloride (NS) flush 5-40 mL  5-40 mL IntraVENous Q8H    sodium chloride (NS) flush 5-40 mL  5-40 mL IntraVENous PRN    aspirin chewable tablet 81 mg  81 mg Oral DAILY    nitroglycerin (NITROBID) 2 % ointment 1 Inch  1 Inch Topical Q6H    nitroglycerin (NITROSTAT) tablet 0.4 mg  0.4 mg SubLINGual Q5MIN PRN    morphine injection 2 mg  2 mg IntraVENous Q4H PRN    sotalol (BETAPACE) tablet 160 mg  160 mg Oral Q12H    rivaroxaban (XARELTO) tablet 20 mg  20 mg Oral DAILY WITH DINNER     Data Review:   Recent Results (from the past 24 hour(s))   EKG, 12 LEAD, SUBSEQUENT    Collection Time: 06/05/19 10:13 AM   Result Value Ref Range    Ventricular Rate 106 BPM    Atrial Rate 293 BPM    QRS Duration 92 ms    Q-T Interval 354 ms    QTC Calculation (Bezet) 470 ms    Calculated P Axis 86 degrees    Calculated R Axis 22 degrees    Calculated T Axis 41 degrees    Diagnosis       Atrial flutter with variable A-V block  Nonspecific ST abnormality  When compared with ECG of 05-JUN-2019 10:11,  Previous ECG has undetermined rhythm, needs review  Confirmed by Saw Morillo MD (), RAZ BARBOUR ([id number]) on 6/5/2019 10:43:45 AM     EKG, 12 LEAD, INITIAL    Collection Time: 06/05/19  1:38 PM   Result Value Ref Range    Ventricular Rate 61 BPM    Atrial Rate 61 BPM    P-R Interval 160 ms    QRS Duration 108 ms    Q-T Interval 474 ms    QTC Calculation (Bezet) 477 ms    Calculated P Axis 45 degrees    Calculated R Axis 47 degrees    Calculated T Axis 53 degrees    Diagnosis       Normal sinus rhythm  Normal ECG  When compared with ECG of 05-JUN-2019 10:13,  Sinus rhythm has replaced Atrial flutter  Vent.  rate has decreased BY  45 BPM  Confirmed by MALIA VILLEGAS (), GRACE MARIE ([id number]) on 6/5/2019 2:16:30 PM     EKG, 12 LEAD, SUBSEQUENT    Collection Time: 06/05/19 10:16 PM   Result Value Ref Range    Ventricular Rate 69 BPM    Atrial Rate 69 BPM    P-R Interval 162 ms    QRS Duration 116 ms    Q-T Interval 450 ms    QTC Calculation (Bezet) 482 ms    Calculated P Axis 56 degrees    Calculated R Axis 22 degrees    Calculated T Axis 54 degrees    Diagnosis       Normal sinus rhythm  Incomplete right bundle branch block  Nonspecific T wave abnormality  Prolonged QT  Abnormal ECG  When compared with ECG of 05-JUN-2019 13:38,  No significant change was found     MAGNESIUM    Collection Time: 06/06/19  3:39 AM   Result Value Ref Range    Magnesium 2.7 (H) 1.8 - 2.4 mg/dL       EKG:  (EKG has been independently visualized by me with interpretation below) NSR, IRBBB, NSST, QTc 482 msec  Assessment:     Principal Problem:    AF (atrial fibrillation) ([facility] (1/2/2010)    Active Problems: Aortic valve stenosis (12/28/2015)      Hypertension (8/22/2016)      Atrial fibrillation with RVR ([facility] (6/4/2019)      Plan:   1. Afib: s/p MOSES guided DCCV, cont sotalol loading, xarelto  2. Sotalol: last ECG QTc acceptable, cont current dose, cont telemetry and post dose ECGs per protocol  3. Bicuspid aortic valve: mild stenosis, moderate dilatation of ascending aorta, willl need close outpatient follow up  4. PPx: Joselito Almendarez MD  Cardiology/Electrophysiology

## 2019-06-06 NOTE — PROGRESS NOTES
Verbal bedside report given to Cornelio Day oncoming RN. Patient's situation, background, assessment and recommendations provided. Opportunity for questions provided. Oncoming RN assumed care of patient.

## 2019-06-06 NOTE — PROGRESS NOTES
Verbal bedside report given to Ulises Omalley oncoming RN. Patient's situation, background, assessment and recommendations provided. Kardex, Mar, and recent results also reviewed. Opportunity for questions provided. Oncoming RN assumed care of patient.

## 2019-06-07 VITALS
SYSTOLIC BLOOD PRESSURE: 108 MMHG | DIASTOLIC BLOOD PRESSURE: 71 MMHG | OXYGEN SATURATION: 94 % | WEIGHT: 258.7 LBS | BODY MASS INDEX: 33.2 KG/M2 | TEMPERATURE: 98 F | HEIGHT: 74 IN | HEART RATE: 51 BPM | RESPIRATION RATE: 18 BRPM

## 2019-06-07 LAB
ANION GAP SERPL CALC-SCNC: 7 MMOL/L (ref 7–16)
ATRIAL RATE: 50 BPM
ATRIAL RATE: 51 BPM
BUN SERPL-MCNC: 17 MG/DL (ref 6–23)
CALCIUM SERPL-MCNC: 8.9 MG/DL (ref 8.3–10.4)
CALCULATED P AXIS, ECG09: 38 DEGREES
CALCULATED P AXIS, ECG09: 47 DEGREES
CALCULATED R AXIS, ECG10: 32 DEGREES
CALCULATED R AXIS, ECG10: 61 DEGREES
CALCULATED T AXIS, ECG11: 44 DEGREES
CALCULATED T AXIS, ECG11: 68 DEGREES
CHLORIDE SERPL-SCNC: 107 MMOL/L (ref 98–107)
CO2 SERPL-SCNC: 26 MMOL/L (ref 21–32)
CREAT SERPL-MCNC: 1.08 MG/DL (ref 0.8–1.5)
DIAGNOSIS, 93000: NORMAL
DIAGNOSIS, 93000: NORMAL
GLUCOSE SERPL-MCNC: 108 MG/DL (ref 65–100)
MAGNESIUM SERPL-MCNC: 2.5 MG/DL (ref 1.8–2.4)
P-R INTERVAL, ECG05: 144 MS
P-R INTERVAL, ECG05: 144 MS
POTASSIUM SERPL-SCNC: 3.9 MMOL/L (ref 3.5–5.1)
Q-T INTERVAL, ECG07: 496 MS
Q-T INTERVAL, ECG07: 528 MS
QRS DURATION, ECG06: 106 MS
QRS DURATION, ECG06: 112 MS
QTC CALCULATION (BEZET), ECG08: 452 MS
QTC CALCULATION (BEZET), ECG08: 486 MS
SODIUM SERPL-SCNC: 140 MMOL/L (ref 136–145)
VENTRICULAR RATE, ECG03: 50 BPM
VENTRICULAR RATE, ECG03: 51 BPM

## 2019-06-07 PROCEDURE — 93005 ELECTROCARDIOGRAM TRACING: CPT | Performed by: INTERNAL MEDICINE

## 2019-06-07 PROCEDURE — 83735 ASSAY OF MAGNESIUM: CPT

## 2019-06-07 PROCEDURE — 80048 BASIC METABOLIC PNL TOTAL CA: CPT

## 2019-06-07 PROCEDURE — 74011250637 HC RX REV CODE- 250/637: Performed by: NURSE PRACTITIONER

## 2019-06-07 PROCEDURE — 36415 COLL VENOUS BLD VENIPUNCTURE: CPT

## 2019-06-07 RX ORDER — GUAIFENESIN 100 MG/5ML
81 LIQUID (ML) ORAL DAILY
Status: SHIPPED | COMMUNITY
Start: 2019-06-08 | End: 2019-06-17

## 2019-06-07 RX ORDER — SOTALOL HYDROCHLORIDE 160 MG/1
160 TABLET ORAL EVERY 12 HOURS
Qty: 60 TAB | Refills: 11 | Status: SHIPPED | OUTPATIENT
Start: 2019-06-07 | End: 2019-07-22

## 2019-06-07 RX ADMIN — ESCITALOPRAM OXALATE 20 MG: 10 TABLET ORAL at 08:03

## 2019-06-07 RX ADMIN — SOTALOL HYDROCHLORIDE 160 MG: 80 TABLET ORAL at 08:03

## 2019-06-07 RX ADMIN — PANTOPRAZOLE SODIUM 40 MG: 40 TABLET, DELAYED RELEASE ORAL at 05:31

## 2019-06-07 RX ADMIN — ASPIRIN 81 MG 81 MG: 81 TABLET ORAL at 08:03

## 2019-06-07 RX ADMIN — LEVOTHYROXINE SODIUM 137 MCG: 50 TABLET ORAL at 07:18

## 2019-06-07 RX ADMIN — Medication 5 ML: at 05:31

## 2019-06-07 RX ADMIN — LISINOPRIL 10 MG: 5 TABLET ORAL at 08:03

## 2019-06-07 NOTE — PROGRESS NOTES
Last dose of sotalol given this morning. . Reviewed by Giorgi Cloud NP. Pt hopeful to go home today.

## 2019-06-07 NOTE — PROGRESS NOTES
Verbal bedside report given to Juan Arita oncoming RN. Patient's situation, background, assessment and recommendations provided. Opportunity for questions provided. Oncoming RN assumed care of patient.

## 2019-06-07 NOTE — PROGRESS NOTES
Verbal bedside report received from [facility], [facility]. Patient's situation, background, assessment and recommendations were provided. Kardex, Mar, and recent results also reviewed. Opportunity for questions provided. Assumed care of patient.

## 2019-06-07 NOTE — DISCHARGE INSTRUCTIONS
Patient Education        Atrial Fibrillation: Care Instructions  Your Care Instructions    Atrial fibrillation is an irregular and often fast heartbeat. Treating this condition is important for several reasons. It can cause blood clots, which can travel from your heart to your brain and cause a stroke. If you have a fast heartbeat, you may feel lightheaded, dizzy, and weak. An irregular heartbeat can also increase your risk for heart failure. Atrial fibrillation is often the result of another heart condition, such as high blood pressure or coronary artery disease. Making changes to improve your heart condition will help you stay healthy and active. Follow-up care is a key part of your treatment and safety. Be sure to make and go to all appointments, and call your doctor if you are having problems. It's also a good idea to know your test results and keep a list of the medicines you take. How can you care for yourself at home? Medicines    · Take your medicines exactly as prescribed. Call your doctor if you think you are having a problem with your medicine. You will get more details on the specific medicines your doctor prescribes.     · If your doctor has given you a blood thinner to prevent a stroke, be sure you get instructions about how to take your medicine safely. Blood thinners can cause serious bleeding problems.     · Do not take any vitamins, over-the-counter drugs, or herbal products without talking to your doctor first.    Lifestyle changes    · Do not smoke. Smoking can increase your chance of a stroke and heart attack. If you need help quitting, talk to your doctor about stop-smoking programs and medicines. These can increase your chances of quitting for good.     · Eat a heart-healthy diet.     · Stay at a healthy weight. Lose weight if you need to.     · Limit alcohol to 2 drinks a day for men and 1 drink a day for women. Too much alcohol can cause health problems.     · Avoid colds and flu.  Get a pneumococcal vaccine shot. If you have had one before, ask your doctor whether you need another dose. Get a flu shot every year. If you must be around people with colds or flu, wash your hands often. Activity    · If your doctor recommends it, get more exercise. Walking is a good choice. Bit by bit, increase the amount you walk every day. Try for at least 30 minutes on most days of the week. You also may want to swim, bike, or do other activities. Your doctor may suggest that you join a cardiac rehabilitation program so that you can have help increasing your physical activity safely.     · Start light exercise if your doctor says it is okay. Even a small amount will help you get stronger, have more energy, and manage stress. Walking is an easy way to get exercise. Start out by walking a little more than you did in the hospital. Gradually increase the amount you walk.     · When you exercise, watch for signs that your heart is working too hard. You are pushing too hard if you cannot talk while you are exercising. If you become short of breath or dizzy or have chest pain, sit down and rest immediately.     · Check your pulse regularly. Place two fingers on the artery at the palm side of your wrist, in line with your thumb. If your heartbeat seems uneven or fast, talk to your doctor. When should you call for help? Call 911 anytime you think you may need emergency care. For example, call if:    · You have symptoms of a heart attack. These may include:  ? Chest pain or pressure, or a strange feeling in the chest.  ? Sweating. ? Shortness of breath. ? Nausea or vomiting. ? Pain, pressure, or a strange feeling in the back, neck, jaw, or upper belly or in one or both shoulders or arms. ? Lightheadedness or sudden weakness. ? A fast or irregular heartbeat. After you call 911, the  may tell you to chew 1 adult-strength or 2 to 4 low-dose aspirin. Wait for an ambulance.  Do not try to drive yourself.     · You have symptoms of a stroke. These may include:  ? Sudden numbness, tingling, weakness, or loss of movement in your face, arm, or leg, especially on only one side of your body. ? Sudden vision changes. ? Sudden trouble speaking. ? Sudden confusion or trouble understanding simple statements. ? Sudden problems with walking or balance. ? A sudden, severe headache that is different from past headaches.     · You passed out (lost consciousness).    Call your doctor now or seek immediate medical care if:    · You have new or increased shortness of breath.     · You feel dizzy or lightheaded, or you feel like you may faint.     · Your heart rate becomes irregular.     · You can feel your heart flutter in your chest or skip heartbeats. Tell your doctor if these symptoms are new or worse.    Watch closely for changes in your health, and be sure to contact your doctor if you have any problems. Where can you learn more? Go to http://ken-serena.info/. Enter U020 in the search box to learn more about \"Atrial Fibrillation: Care Instructions. \"  Current as of: July 22, 2018  Content Version: 11.9  © 3766-8694 [employer]. Care instructions adapted under license by [employer] (which disclaims liability or warranty for this information). If you have questions about a medical condition or this instruction, always ask your healthcare professional. [address] any warranty or liability for your use of this information. Patient Education        Electrical Cardioversion: What to Expect at Home  Your Recovery    Electrical cardioversion is a treatment for an abnormal heartbeat, such as atrial fibrillation, supraventricular tachycardia, or ventricular tachycardia (VT). It uses a brief electrical shock to reset your heart's rhythm. After cardioversion, you may have redness, like a sunburn, where the patches were.  The medicines you got to make you sleepy may make you feel drowsy for the rest of the day. Your doctor may have you take medicines to help the heart beat normally and to prevent blood clots. This care sheet gives you a general idea about how long it will take for you to recover. But each person recovers at a different pace. Follow the steps below to feel better as quickly as possible. How can you care for yourself at home? Medicines    · Be safe with medicines. Take your medicines exactly as prescribed. Call your doctor if you think you are having a problem with your medicine. You may take one or more of the following medicines:  ? Rate-control medicines to slow the heart rate. These include beta-blockers, calcium channel blockers, and digoxin. ? Rhythm control medicines that help the heart keep a normal rhythm. ? Blood thinners, also called anticoagulants, which help prevent blood clots. You will get more details on the specific medicines your doctor prescribes. Be sure you know how to take your medicines safely.     · Do not take any vitamins, over-the-counter medicines, or herbal products without talking to your doctor first.   Exercise    · Start light exercise if your doctor says that it is okay. Even a small amount will help you get stronger, have more energy, and manage your stress. Walking is an easy way to get exercise. Start out by walking a little more than you did in the hospital. Bit by bit, increase the amount you walk.     · When you exercise, watch for signs that your heart is working too hard. You are pushing too hard if you cannot talk while you are exercising. If you become short of breath or dizzy or have chest pain, sit down and rest right away.     · Check your pulse regularly. Place two fingers on the artery at the palm side of your wrist in line with your thumb. If your heartbeat seems uneven or fast, talk to your doctor. Other instructions    · Ask your doctor when you can drive again.     · Do not smoke.  If you need help quitting, talk to your doctor about stop-smoking programs and medicines. These can increase your chances of quitting for good.     · Limit alcohol. Follow-up care is a key part of your treatment and safety. Be sure to make and go to all appointments, and call your doctor if you are having problems. It's also a good idea to know your test results and keep a list of the medicines you take. When should you call for help? Call 911 anytime you think you may need emergency care. For example, call if:    · You passed out (lost consciousness).     · You have chest pain or pressure. This may occur with:  ? Sweating. ? Shortness of breath. ? Nausea or vomiting. ? Pain that spreads from the chest to the neck, jaw, or one or both shoulders or arms. ? A fast or uneven pulse. After calling 911, the  may tell you to chew 1 adult-strength or 2 to 4 low-dose aspirin. Wait for an ambulance. Do not try to drive yourself.     · You have symptoms of a stroke. These may include:  ? Sudden numbness, tingling, weakness, or loss of movement in your face, arm, or leg, especially on only one side of your body. ? Sudden vision changes. ? Sudden trouble speaking. ? Sudden confusion or trouble understanding simple statements. ? Sudden problems with walking or balance. ? A sudden, severe headache that is different from past headaches.    Call your doctor now or seek immediate medical care if:    · You feel dizzy or lightheaded, or you feel like you may faint.     · You have a fast or irregular heartbeat.    Watch closely for any changes in your health, and be sure to contact your doctor if you have any problems. Where can you learn more? Go to http://ken-serena.info/. Enter A617 in the search box to learn more about \"Electrical Cardioversion: What to Expect at Home. \"  Current as of: July 22, 2018  Content Version: 11.9  © 2077-6281 [employer].  Care instructions adapted under license by [employer] (which disclaims liability or warranty for this information). If you have questions about a medical condition or this instruction, always ask your healthcare professional. [address] any warranty or liability for your use of this information. Patient Education   Aspirin (By mouth)   Aspirin (AS-pir-in)  Treats pain, fever, and inflammation. May lower risk of heart attack and stroke. Brand Name(s): Ascriptin Regular Strength, Aspergum, Aspir Low, Aspirin Adult Low Dose, Aspirin Low Dose, Nessa Aspirin Children's, Nessa Aspirin Regimen, Nessa Extra Strength, Nessa Genuine Aspirin, Nessa Low Dose, Bufferin, Bufferin Low Dose, Durlaza, Ecotrin, Ecpirin   There may be other brand names for this medicine. When This Medicine Should Not Be Used: This medicine is not right for everyone. Do not use it if you had an allergic reaction to aspirin or other NSAIDs, or if you have a history of asthma with nasal polyps and rhinitis. How to Use This Medicine:   Delayed Release Capsule, Long Acting Capsule, Gum, Tablet, Chewable Tablet, Fizzy Tablet, Coated Tablet, Long Acting Tablet, 24 Hour Capsule  · Your doctor will tell you how much medicine to use. Do not use more than directed. · It is best to take this medicine with food or milk. · Capsule, tablet, or coated tablet: Swallow whole. Do not crush, break, or chew it. · Chewable tablet: You may chew it completely or swallow it whole. · Gum: Chew completely to make sure you get as much medicine as possible. Drink a full glass (8 ounces) of water after chewing the gum. · Swallow the extended-release capsule whole. Do not crush, break, or chew it. Take the capsule with a full glass of water at the same time each day. · Follow the instructions on the medicine label if you are using this medicine without a prescription. · Missed dose:  If you miss a dose of Durlaza, skip the missed dose and go back to your regular dosing schedule. Do not take extra medicine to make up for a missed dose. · Store the medicine in a closed container at room temperature, away from heat, moisture, and direct light. Drugs and Foods to Avoid:   Ask your doctor or pharmacist before using any other medicine, including over-the-counter medicines, vitamins, and herbal products. · Some foods and medicines can affect how aspirin works. Tell your doctor if you are using any of the following:  ¨ Dipyridamole, methotrexate, probenecid, sulfinpyrazone, ticlopidine  ¨ Blood thinner (including clopidogrel, prasugrel, ticagrelor, warfarin)  ¨ Blood pressure medicine  ¨ Medicine to treat seizures (including phenytoin, valproic acid)  ¨ NSAID pain or arthritis medicine (including celecoxib, diclofenac, ibuprofen, naproxen)  ¨ Steroid medicine (including dexamethasone, hydrocortisone, methylprednisolone, prednisolone, prednisone)  · Do not take Durlaza 2 hours before or 1 hour after you drink alcohol or take medicines that contain alcohol. Warnings While Using This Medicine:   · Tell your doctor if you are pregnant or breastfeeding. Do not use this medicine during the later part of a pregnancy unless your doctor tells you to. · Tell your doctor if you have kidney disease, liver disease, high blood pressure, heart disease, or a history of stomach bleeding or ulcers. · This medicine may increase your risk for bleeding, including stomach ulcers. · Do not give aspirin to a child or teenager who has chickenpox or flu symptoms, unless the doctor says it is okay. Aspirin can cause a life-threatening reaction called Reye syndrome. · Tell any doctor or dentist who treats you that you are using this medicine. This medicine may affect certain medical test results. · Keep all medicine out of the reach of children. Never share your medicine with anyone.   Possible Side Effects While Using This Medicine:   Call your doctor right away if you notice any of these side effects:  · Allergic reaction: Itching or hives, swelling in your face or hands, swelling or tingling in your mouth or throat, chest tightness, trouble breathing  · Bloody or black stools, bloody vomit or vomit that looks like coffee grounds  · Chest tightness, wheezing  · Ringing in the ears  · Severe stomach pain  · Unusual bleeding, bruising, or weakness  If you notice other side effects that you think are caused by this medicine, tell your doctor. Call your doctor for medical advice about side effects. You may report side effects to FDA at 6-773-ULC-5204  © 2017 [facility] Information is for End User's use only and may not be sold, redistributed or otherwise used for commercial purposes. The above information is an  only. It is not intended as medical advice for individual conditions or treatments. Talk to your doctor, nurse or pharmacist before following any medical regimen to see if it is safe and effective for you. Patient Education   Rivaroxaban (By mouth)   Rivaroxaban ([id number])  Treats and prevents blood clots, which lowers the risk of stroke, deep vein thrombosis (DVT), pulmonary embolism (PE), and similar conditions. This medicine is a blood thinner. Brand Name(s): Xarelto, Xarelto Starter Pack   There may be other brand names for this medicine. When This Medicine Should Not Be Used: This medicine is not right for everyone. Do not use it if you had an allergic reaction to rivaroxaban, or you have severe bleeding. How to Use This Medicine:   Tablet  · Take this medicine as directed, and take it at the same time each day. · 10-milligram (mg) tablet: Take with or without food. · 15-mg or 20-mg tablet: Take with food. · If you cannot swallow the tablets, you may crush the tablet and mix it with applesauce. Eat some food after you swallow the mixture. · Tube feeding:  You may crush the tablet and mix the medicine in 50 milliliters (mL) of water before giving it via the tube. This must be followed by a feeding. · This medicine should come with a Medication Guide. Ask your pharmacist for a copy if you do not have one. · Missed dose:   ¨ Ask your doctor or pharmacist if you are not sure what to do if you miss a dose. ¨ Once-daily dose: If you miss a dose or forget to use your medicine, use it as soon as you can on the same day. Do not use extra medicine to make up for a missed dose. ¨ Twice-daily dose to treat a blood clot (15-mg tablet): If you miss a dose or forget to use your medicine, use it as soon as you can on the same day. You may take 2 doses at the same time to make up for the missed dose. This is only for people who take a total of 30 mg per day. · Store the medicine in a closed container at room temperature, away from heat, moisture, and direct light. Drugs and Foods to Avoid:   Ask your doctor or pharmacist before using any other medicine, including over-the-counter medicines, vitamins, and herbal products. · Some foods and medicines can affect how rivaroxaban works. Tell your doctor if you are using any of the following:  ¨ NSAID medicine (including aspirin, celecoxib, diclofenac, ibuprofen, naproxen)  ¨ Ketoconazole, itraconazole, lopinavir, ritonavir, indinavir, conivaptan, carbamazepine, phenytoin, rifampin, Linda's wort  ¨ Another blood thinner (including clopidogrel, enoxaparin, heparin, warfarin)  Warnings While Using This Medicine:   · Tell your doctor if you are pregnant or breastfeeding, or if you have kidney disease, liver disease, bleeding problems, or an artificial heart valve. · This medicine may increase your risk of bleeding. Be careful to avoid injuries that could cause bleeding. Stay away from rough sports or other situations where you could be bruised, cut, or hurt. Brush and floss your teeth gently. Be careful when using sharp objects, including razors and fingernail clippers. Avoid picking your nose.  If you need to blow your nose, blow it gently. · This medicine may cause nerve damage if you have a medical procedure done to your back, including anesthesia or a spinal puncture. This is more likely to happen if you have a history of back injury, back surgery, problems with your spine, or procedures or punctures to your back. Tell your doctor if you are also taking another blood thinner, because this also increases the risk. · Do not stop using this medicine suddenly without asking your doctor. You might have a higher risk of stroke for a short time after you stop using this medicine. · Tell any doctor or dentist who treats you that you are using this medicine. · Your doctor will do lab tests at regular visits to check on the effects of this medicine. Keep all appointments. · Keep all medicine out of the reach of children. Never share your medicine with anyone. Possible Side Effects While Using This Medicine:   Call your doctor right away if you notice any of these side effects:  · Allergic reaction: Itching or hives, swelling in your face or hands, swelling or tingling in your mouth or throat, chest tightness, trouble breathing  · Blistering, peeling, or red skin rash  · Decrease in how much or how often you urinate  · Heavy menstrual bleeding, or vaginal bleeding  · Red or brown urine, bloody or black, tarry stools  · Unusual bleeding or bruising, including frequent nosebleeds  · Vomiting blood or material that looks like coffee grounds  If you notice other side effects that you think are caused by this medicine, tell your doctor. Call your doctor for medical advice about side effects. You may report side effects to FDA at 2-663-GIE-5948  © 2017 [facility] Information is for End User's use only and may not be sold, redistributed or otherwise used for commercial purposes. The above information is an  only. It is not intended as medical advice for individual conditions or treatments.  Talk to your doctor, nurse or pharmacist before following any medical regimen to see if it is safe and effective for you. Patient Education   Sotalol (By mouth)   Sotalol (KEENAN-ta-lol)  Treats heart rhythm problems. This medicine is a beta blocker. Brand Name(s): Betapace, Betapace AF, Sorine, Sotylize   There may be other brand names for this medicine. When This Medicine Should Not Be Used: This medicine is not right for everyone. Do not use it if you had an allergic reaction to sotalol, or you have certain heart or lung problems. Talk with your doctor about what these problems are. How to Use This Medicine:   Liquid, Tablet  · Take your medicine as directed. Your dose may need to be changed several times to find what works best for you. · Measure the oral liquid medicine with a marked measuring spoon, oral syringe, or medicine cup. · Contact your doctor or pharmacist before your supply of this medicine starts to run low. Do not allow yourself to run out of medicine. · Read and follow the patient instructions that come with this medicine. Talk to your doctor or pharmacist if you have any questions. · Missed dose: Take a dose as soon as you remember. If it is almost time for your next dose, wait until then and take a regular dose. Do not take extra medicine to make up for a missed dose. · Store the medicine in a closed container at room temperature, away from heat, moisture, and direct light. Drugs and Foods to Avoid:   Ask your doctor or pharmacist before using any other medicine, including over-the-counter medicines, vitamins, and herbal products. · Some foods and medicines can affect how sotalol works.  Tell your doctor if you are using the following:  ¨ Albuterol, clonidine, digoxin, guanethidine, isoproterenol, reserpine, terbutaline  ¨ Blood pressure medicines  ¨ Insulin or diabetes medicine  ¨ Medicine to treat depression  ¨ Medicine to treat an infection  ¨ Other medicine to treat heart rhythm problems, such as amiodarone, disopyramide, procainamide, or quinidine  ¨ Phenothiazine medicine (such as chlorpromazine, perphenazine, prochlorperazine, promethazine, thioridazine)  · If you are taking an antacid that contains aluminum or magnesium hydroxide, take it 2 hours before or 2 hours after you take sotalol. Warnings While Using This Medicine:   · Tell your doctor if you are pregnant or breastfeeding, or if you have kidney disease, diabetes, heart disease, angina, low blood pressure, lung or breathing problems, overactive thyroid, or a history of severe allergic reactions or heart attack. · This medicine may cause increased heart rhythm problems while your dose is being adjusted. · Do not stop using this medicine suddenly. Your doctor will need to slowly decrease your dose before you stop it completely. You could have worsening chest pain or heart rhythm problems if you stop using this medicine suddenly. · This medicine may raise or lower your blood sugar level. · This medicine may make you dizzy. Do not drive or do anything else that could be dangerous until you know how this medicine affects you. Stand up slowly if you feel dizzy or lightheaded. · Tell any doctor or dentist who treats you that you are using this medicine. · Your doctor will do lab tests at regular visits to check on the effects of this medicine. Keep all appointments. ECG tests will be needed to check for unwanted effects. · Keep all medicine out of the reach of children. Never share your medicine with anyone.   Possible Side Effects While Using This Medicine:   Call your doctor right away if you notice any of these side effects:  · Allergic reaction: Itching or hives, swelling in your face or hands, swelling or tingling in your mouth or throat, chest tightness, trouble breathing  · Chest pain  · Dry mouth, increased thirst, muscle cramps, nausea or vomiting  · Fainting, dizziness, lightheadedness  · Fast, slow, or irregular heartbeat  · Rapid weight gain, swelling in your hands, feet, or ankles, trouble breathing  If you notice these less serious side effects, talk with your doctor:   · Diarrhea  · Increased sweating  · Tiredness or weakness  If you notice other side effects that you think are caused by this medicine, tell your doctor. Call your doctor for medical advice about side effects. You may report side effects to FDA at 2-465-FDA-3989  © 2017 [address] Information is for End User's use only and may not be sold, redistributed or otherwise used for commercial purposes. The above information is an  only. It is not intended as medical advice for individual conditions or treatments. Talk to your doctor, nurse or pharmacist before following any medical regimen to see if it is safe and effective for you.

## 2019-06-07 NOTE — PROGRESS NOTES
Care Management Interventions  PCP Verified by CM: Yes  Last Visit to PCP: 02/17/19  Mode of Transport at Discharge: Other (see comment)(Connie Florez Girlfriend [phone] )  Transition of Care Consult (CM Consult): Discharge Planning  Discharge Durable Medical Equipment: No  Physical Therapy Consult: No  Occupational Therapy Consult: No  Speech Therapy Consult: No  Current Support Network: Lives Alone, Own Home  Confirm Follow Up Transport: Self  Plan discussed with Pt/Family/Caregiver: Yes  Freedom of Choice Offered: Yes  Discharge Location  Discharge Placement: Home      Pt to [place] home. CM provided [facility] application, xarelto coupon - free 30 days,  & [employer], pt is in agreement. No further CM needs.

## 2019-06-07 NOTE — PROGRESS NOTES
Pt in SR and SB. Pt on sotalol dosing. EKG completed two hours post sotalol administration. Problem: Afib Pathway: Day 3  Goal: Treatments/Interventions/Procedures  Outcome: Progressing Towards Goal     Problem: Afib: Discharge Outcomes (not in CAT)  Goal: *Verbalizes understanding and describes medication purposes and frequencies  Outcome: Progressing Towards Goal     Pt wearing gripper socks. Problem: Falls - Risk of  Goal: *Absence of Falls  Description  Document Antwan Peter Fall Risk and appropriate interventions in the flowsheet.   Outcome: Progressing Towards Goal

## 2019-06-07 NOTE — PROGRESS NOTES
[facility] CARDIOLOGY PROGRESS NOTE           6/7/2019 6:30 AM    Admit Date: 6/4/2019    Admit Diagnosis: Atrial fibrillation with RVR (HCC) [I48.91]      Subjective:   No complaints this AM, no chest pain or shortness of breath    Interval History: (History of pertinent interval events obtained from nursing staff)  S/p MOSES guided DCCV to NSR, no events overnight    ROS:  GEN:  No fever or chills  Cardiovascular:  As noted above  Pulmonary:  As noted above  Neuro:  No new focal motor or sensory loss      Objective:     Vitals:    06/06/19 2118 06/07/19 0137 06/07/19 0405 06/07/19 0511   BP: 111/78 127/67  106/72   Pulse: (!) 52 63  (!) 53   Resp: 18 18  18   Temp: 98.4 °F (36.9 °C) 98.5 °F (36.9 °C)  98.7 °F (37.1 °C)   SpO2: 95% 95%  94%   Weight:   258 lb 11.2 oz (117.3 kg)    Height:           Physical Exam:  General-Well Developed, Well Nourished, No Acute Distress, Alert & Oriented x 3, appropriate mood. Neck- supple, no JVD  CV- regular rate and rhythm no MRG  Lung- clear bilaterally  Abd- soft, nontender, nondistended  Ext- no edema bilaterally.   Skin- warm and dry    Current Facility-Administered Medications   Medication Dose Route Frequency    acetaminophen (TYLENOL) tablet 650 mg  650 mg Oral Q6H PRN    escitalopram oxalate (LEXAPRO) tablet 20 mg  20 mg Oral DAILY    levothyroxine (SYNTHROID) tablet 137 mcg  137 mcg Oral ACB    lisinopril (PRINIVIL, ZESTRIL) tablet 10 mg  10 mg Oral DAILY    LORazepam (ATIVAN) tablet 0.5 mg  0.5 mg Oral BID PRN    pantoprazole (PROTONIX) tablet 40 mg  40 mg Oral ACB    sodium chloride (NS) flush 5-40 mL  5-40 mL IntraVENous Q8H    sodium chloride (NS) flush 5-40 mL  5-40 mL IntraVENous PRN    aspirin chewable tablet 81 mg  81 mg Oral DAILY    nitroglycerin (NITROBID) 2 % ointment 1 Inch  1 Inch Topical Q6H    nitroglycerin (NITROSTAT) tablet 0.4 mg  0.4 mg SubLINGual Q5MIN PRN    morphine injection 2 mg  2 mg IntraVENous Q4H PRN    sotalol (BETAPACE) tablet 160 mg  160 mg Oral Q12H    rivaroxaban (XARELTO) tablet 20 mg  20 mg Oral DAILY WITH DINNER     Data Review:   Recent Results (from the past 24 hour(s))   EKG, 12 LEAD, SUBSEQUENT    Collection Time: 06/06/19  9:54 AM   Result Value Ref Range    Ventricular Rate 58 BPM    Atrial Rate 58 BPM    P-R Interval 152 ms    QRS Duration 110 ms    Q-T Interval 488 ms    QTC Calculation (Bezet) 479 ms    Calculated P Axis 55 degrees    Calculated R Axis 58 degrees    Calculated T Axis 64 degrees    Diagnosis       Sinus bradycardia  Otherwise normal ECG  When compared with ECG of 05-JUN-2019 22:16,  No significant change was found  Confirmed by MALIA VILLEGAS (), Deisy Alvares ([id number]) on 6/6/2019 10:10:36 AM     EKG, 12 LEAD, SUBSEQUENT    Collection Time: 06/06/19  9:57 PM   Result Value Ref Range    Ventricular Rate 51 BPM    Atrial Rate 51 BPM    P-R Interval 144 ms    QRS Duration 112 ms    Q-T Interval 528 ms    QTC Calculation (Bezet) 486 ms    Calculated P Axis 47 degrees    Calculated R Axis 32 degrees    Calculated T Axis 44 degrees    Diagnosis       Sinus bradycardia  Prolonged QT  Abnormal ECG  When compared with ECG of 06-JUN-2019 09:54,  No significant change was found  Confirmed by Avis Berkowitz MD (), GRACE MAREI ([id number]) on 6/7/2019 7:31:23 AM     MAGNESIUM    Collection Time: 06/07/19  4:03 AM   Result Value Ref Range    Magnesium 2.5 (H) 1.8 - 2.4 mg/dL   METABOLIC PANEL, BASIC    Collection Time: 06/07/19  4:03 AM   Result Value Ref Range    Sodium 140 136 - 145 mmol/L    Potassium 3.9 3.5 - 5.1 mmol/L    Chloride 107 98 - 107 mmol/L    CO2 26 21 - 32 mmol/L    Anion gap 7 7 - 16 mmol/L    Glucose 108 (H) 65 - 100 mg/dL    BUN 17 6 - 23 MG/DL    Creatinine 1.08 0.8 - 1.5 MG/DL    GFR est AA >60 >60 ml/min/1.73m2    GFR est non-AA >60 >60 ml/min/1.73m2    Calcium 8.9 8.3 - 10.4 MG/DL       EKG:  (EKG has been independently visualized by me with interpretation below) NSR, IRBBB, NSST, QTc 482 msec  Assessment:     Principal Problem:    AF (atrial fibrillation) ([facility]) (1/2/2010)    Active Problems: Aortic valve stenosis (12/28/2015)      Hypertension (8/22/2016)      Atrial fibrillation with RVR ([address] (6/4/2019)      Plan:   1. Afib: s/p MOSES guided DCCV, cont sotalol loading, Xarelto. 2. Sotalol: last ECG QTc acceptable, cont current dose, cont telemetry and post dose ECGs per protocol, likely discharge tomorrow. 3. Bicuspid aortic valve: mild stenosis, moderate dilatation of ascending aorta, willl need close outpatient follow up  4. PPx: Erhard Jillian Rneae MD, [facility] Cardiac Electrophysiology  [facility] Cardiology

## 2019-06-07 NOTE — DISCHARGE SUMMARY
[facility] Cardiology Discharge Summary     Patient ID:  Nestor Diana  [id number]  66 y.o.  1960    Admit date: 6/4/2019    Discharge date and time:  06-    Admitting Physician: Domitila Wade MD     Discharge Physician: Jayant Coats NP/    Admission Diagnoses: Atrial fibrillation with RVR [facility]) [I48.91]    Discharge Diagnoses:    Diagnosis    Atrial fibrillation with RVR ([facility]    Bicuspid aortic valve    A-fib ([facility]    Hypertension    Aortic valve stenosis    Dyspnea    Palpitations    AF (atrial fibrillation) ([facility]    Hypothyroidism       Cardiology Procedures this admission: MOSES/eCV, echocardiogram  Consults: None    Hospital Course: Pt presented to [facility] on 6/[facility]/19 with nausea and vomiting, he was found to be in a fib with RVR. Pt was placed on cardizem drip, sotalol, but no AC and transferred to [facility] for further evaluation. On arrival, he remained in A fib with RVR @ 130. Pt came in to [facility] and was taken to the cath lab by Dr. Durga Jrodan. Pt had MOSES CV with no evidence of LA thrombus. Pt had successful eCV back to [facility]. Pt tolerated the procedure well and was taken to the telemetry floor for recovery. Pt was monitored on telemetry for 6 doses of sotalol with EKGs showing acceptable Qtc intervals. Pt was up feeling well without any complaints of CP, SOB or palpitations. Pt's labs were WNL. Pt was seen and examined by Dr. Evelia Castañeda and determined stable and ready for discharge. Pt was instructed on the importance of taking Sotalol and Xarelto without missing a dose. Pt will follow up in the office in the next 2-4  weeks. DISPOSITION: The patient is being discharged home in stable condition on a low saturated fat, low cholesterol and low salt diet. Pt is instructed to advance activities as tolerated limited to fatigue or shortness of breath.  Pt is instructed to call office or return to ER for immediate evaluation of any shortness of breath, palpitations or chest pain.    Follow up with [facility] Dr. Rianna Martínez on July 1, 2019 @ 430 pm.       Discharge Exam:   Visit Vitals  /71   Pulse (!) 51   Temp 98 °F (36.7 °C)   Resp 18   Ht 6' 2\" (1.88 m)   Wt 117.3 kg (258 lb 11.2 oz)   SpO2 94%   BMI 33.22 kg/m²       Recent Results (from the past 24 hour(s))   EKG, 12 LEAD, SUBSEQUENT    Collection Time: 06/06/19  9:54 AM   Result Value Ref Range    Ventricular Rate 58 BPM    Atrial Rate 58 BPM    P-R Interval 152 ms    QRS Duration 110 ms    Q-T Interval 488 ms    QTC Calculation (Bezet) 479 ms    Calculated P Axis 55 degrees    Calculated R Axis 58 degrees    Calculated T Axis 64 degrees    Diagnosis       Sinus bradycardia  Otherwise normal ECG  When compared with ECG of 05-JUN-2019 22:16,  No significant change was found  Confirmed by MALIA VILLEGAS (), Augustine Cleary ([id number]) on 6/6/2019 10:10:36 AM     EKG, 12 LEAD, SUBSEQUENT    Collection Time: 06/06/19  9:57 PM   Result Value Ref Range    Ventricular Rate 51 BPM    Atrial Rate 51 BPM    P-R Interval 144 ms    QRS Duration 112 ms    Q-T Interval 528 ms    QTC Calculation (Bezet) 486 ms    Calculated P Axis 47 degrees    Calculated R Axis 32 degrees    Calculated T Axis 44 degrees    Diagnosis       Sinus bradycardia  Prolonged QT  Abnormal ECG  When compared with ECG of 06-JUN-2019 09:54,  No significant change was found  Confirmed by [facility] [place]  MD (), GRACE MARIE ([id number]) on 6/7/2019 7:31:23 AM     MAGNESIUM    Collection Time: 06/07/19  4:03 AM   Result Value Ref Range    Magnesium 2.5 (H) 1.8 - 2.4 mg/dL   METABOLIC PANEL, BASIC    Collection Time: 06/07/19  4:03 AM   Result Value Ref Range    Sodium 140 136 - 145 mmol/L    Potassium 3.9 3.5 - 5.1 mmol/L    Chloride 107 98 - 107 mmol/L    CO2 26 21 - 32 mmol/L    Anion gap 7 7 - 16 mmol/L    Glucose 108 (H) 65 - 100 mg/dL    BUN 17 6 - 23 MG/DL    Creatinine 1.08 0.8 - 1.5 MG/DL    GFR est AA >60 >60 ml/min/1.73m2    GFR est non-AA >60 >60 ml/min/1.73m2    Calcium 8.9 8.3 - 10.4 MG/DL         Patient Instructions:     Current Discharge Medication List      START taking these medications    Details   aspirin 81 mg chewable tablet Take 1 Tab by mouth daily. sotalol (BETAPACE) 160 mg tablet Take 1 Tab by mouth every twelve (12) hours. Qty: 60 Tab, Refills: 11      rivaroxaban (XARELTO) 20 mg tab tablet Take 1 Tab by mouth daily (with dinner). Qty: 30 Tab, Refills: 11         CONTINUE these medications which have NOT CHANGED    Details   escitalopram oxalate (LEXAPRO) 20 mg tablet Take 20 mg by mouth daily as needed. pantoprazole (PROTONIX) 40 mg tablet Take 1 Tab by mouth every twelve (12) hours. Qty: 60 Tab, Refills: 0      LORazepam (ATIVAN) 0.5 mg tablet Take 0.5 mg by mouth two (2) times daily as needed. levothyroxine (SYNTHROID) 137 mcg tablet Take  by mouth Daily (before breakfast). lisinopril (PRINIVIL, ZESTRIL) 10 mg tablet Take 1 Tab by mouth daily.   Qty: 90 Tab, Refills: 3         STOP taking these medications       aspirin (ASPIRIN) 325 mg tablet Comments:   Reason for Stopping:         flecainide (TAMBOCOR) 100 mg tablet Comments:   Reason for Stopping:         ondansetron (ZOFRAN ODT) 4 mg disintegrating tablet Comments:   Reason for Stopping:               Signed:  Zeeshan Boucher NP  6/7/2019  8:34 AM

## 2019-06-17 ENCOUNTER — HOSPITAL ENCOUNTER (OUTPATIENT)
Dept: CARDIAC CATH/INVASIVE PROCEDURES | Age: 59
Discharge: HOME | End: 2019-06-17
Attending: INTERNAL MEDICINE | Admitting: INTERNAL MEDICINE
Payer: MEDICAID

## 2019-06-17 VITALS
DIASTOLIC BLOOD PRESSURE: 64 MMHG | HEIGHT: 74 IN | WEIGHT: 258 LBS | SYSTOLIC BLOOD PRESSURE: 110 MMHG | OXYGEN SATURATION: 92 % | RESPIRATION RATE: 11 BRPM | HEART RATE: 54 BPM | BODY MASS INDEX: 33.11 KG/M2

## 2019-06-17 LAB
ANION GAP SERPL CALC-SCNC: 6 MMOL/L (ref 7–16)
ATRIAL RATE: 250 BPM
ATRIAL RATE: 53 BPM
BUN SERPL-MCNC: 21 MG/DL (ref 6–23)
CALCIUM SERPL-MCNC: 8.8 MG/DL (ref 8.3–10.4)
CALCULATED P AXIS, ECG09: 21 DEGREES
CALCULATED P AXIS, ECG09: 81 DEGREES
CALCULATED R AXIS, ECG10: 16 DEGREES
CALCULATED R AXIS, ECG10: 27 DEGREES
CALCULATED T AXIS, ECG11: 23 DEGREES
CALCULATED T AXIS, ECG11: 24 DEGREES
CHLORIDE SERPL-SCNC: 108 MMOL/L (ref 98–107)
CO2 SERPL-SCNC: 23 MMOL/L (ref 21–32)
CREAT SERPL-MCNC: 1.11 MG/DL (ref 0.8–1.5)
DIAGNOSIS, 93000: NORMAL
DIAGNOSIS, 93000: NORMAL
ERYTHROCYTE [DISTWIDTH] IN BLOOD BY AUTOMATED COUNT: 13.9 % (ref 11.9–14.6)
GLUCOSE SERPL-MCNC: 99 MG/DL (ref 65–100)
HCT VFR BLD AUTO: 46.1 % (ref 41.1–50.3)
HGB BLD-MCNC: 15.3 G/DL (ref 13.6–17.2)
INR PPP: 1.1
MAGNESIUM SERPL-MCNC: 2.3 MG/DL (ref 1.8–2.4)
MCH RBC QN AUTO: 31.5 PG (ref 26.1–32.9)
MCHC RBC AUTO-ENTMCNC: 33.2 G/DL (ref 31.4–35)
MCV RBC AUTO: 95.1 FL (ref 79.6–97.8)
NRBC # BLD: 0 K/UL (ref 0–0.2)
P-R INTERVAL, ECG05: 148 MS
PLATELET # BLD AUTO: 323 K/UL (ref 150–450)
PMV BLD AUTO: 11.9 FL (ref 9.4–12.3)
POTASSIUM SERPL-SCNC: 6 MMOL/L (ref 3.5–5.1)
PROTHROMBIN TIME: 14.4 SEC (ref 11.7–14.5)
Q-T INTERVAL, ECG07: 422 MS
Q-T INTERVAL, ECG07: 500 MS
QRS DURATION, ECG06: 102 MS
QRS DURATION, ECG06: 106 MS
QTC CALCULATION (BEZET), ECG08: 469 MS
QTC CALCULATION (BEZET), ECG08: 519 MS
RBC # BLD AUTO: 4.85 M/UL (ref 4.23–5.6)
SODIUM SERPL-SCNC: 137 MMOL/L (ref 136–145)
VENTRICULAR RATE, ECG03: 53 BPM
VENTRICULAR RATE, ECG03: 91 BPM
WBC # BLD AUTO: 7.5 K/UL (ref 4.3–11.1)

## 2019-06-17 PROCEDURE — 99152 MOD SED SAME PHYS/QHP 5/>YRS: CPT

## 2019-06-17 PROCEDURE — 92960 CARDIOVERSION ELECTRIC EXT: CPT

## 2019-06-17 PROCEDURE — 80048 BASIC METABOLIC PNL TOTAL CA: CPT

## 2019-06-17 PROCEDURE — 74011250636 HC RX REV CODE- 250/636

## 2019-06-17 PROCEDURE — 83735 ASSAY OF MAGNESIUM: CPT

## 2019-06-17 PROCEDURE — 99153 MOD SED SAME PHYS/QHP EA: CPT

## 2019-06-17 PROCEDURE — 93005 ELECTROCARDIOGRAM TRACING: CPT | Performed by: INTERNAL MEDICINE

## 2019-06-17 PROCEDURE — 85610 PROTHROMBIN TIME: CPT

## 2019-06-17 PROCEDURE — 85027 COMPLETE CBC AUTOMATED: CPT

## 2019-06-17 PROCEDURE — 74011250636 HC RX REV CODE- 250/636: Performed by: INTERNAL MEDICINE

## 2019-06-17 RX ORDER — MIDAZOLAM HYDROCHLORIDE 1 MG/ML
1-2 INJECTION, SOLUTION INTRAMUSCULAR; INTRAVENOUS
Status: DISCONTINUED | OUTPATIENT
Start: 2019-06-17 | End: 2019-06-17 | Stop reason: HOSPADM

## 2019-06-17 RX ORDER — FENTANYL CITRATE 50 UG/ML
25-50 INJECTION, SOLUTION INTRAMUSCULAR; INTRAVENOUS
Status: DISCONTINUED | OUTPATIENT
Start: 2019-06-17 | End: 2019-06-17 | Stop reason: HOSPADM

## 2019-06-17 RX ORDER — SODIUM CHLORIDE 9 MG/ML
75 INJECTION, SOLUTION INTRAVENOUS CONTINUOUS
Status: DISCONTINUED | OUTPATIENT
Start: 2019-06-17 | End: 2019-06-17 | Stop reason: HOSPADM

## 2019-06-17 RX ADMIN — SODIUM CHLORIDE 75 ML/HR: 900 INJECTION, SOLUTION INTRAVENOUS at 11:24

## 2019-06-17 RX ADMIN — MIDAZOLAM HYDROCHLORIDE 2 MG: 1 INJECTION, SOLUTION INTRAMUSCULAR; INTRAVENOUS at 11:58

## 2019-06-17 RX ADMIN — MIDAZOLAM HYDROCHLORIDE 2 MG: 1 INJECTION, SOLUTION INTRAMUSCULAR; INTRAVENOUS at 12:05

## 2019-06-17 RX ADMIN — MIDAZOLAM HYDROCHLORIDE 2 MG: 1 INJECTION, SOLUTION INTRAMUSCULAR; INTRAVENOUS at 12:03

## 2019-06-17 RX ADMIN — MIDAZOLAM HYDROCHLORIDE 2 MG: 1 INJECTION, SOLUTION INTRAMUSCULAR; INTRAVENOUS at 12:00

## 2019-06-17 RX ADMIN — MIDAZOLAM HYDROCHLORIDE 2 MG: 1 INJECTION, SOLUTION INTRAMUSCULAR; INTRAVENOUS at 12:04

## 2019-06-17 RX ADMIN — MIDAZOLAM HYDROCHLORIDE 2 MG: 1 INJECTION, SOLUTION INTRAMUSCULAR; INTRAVENOUS at 11:57

## 2019-06-17 RX ADMIN — MIDAZOLAM HYDROCHLORIDE 2 MG: 1 INJECTION, SOLUTION INTRAMUSCULAR; INTRAVENOUS at 11:53

## 2019-06-17 RX ADMIN — MIDAZOLAM HYDROCHLORIDE 2 MG: 1 INJECTION, SOLUTION INTRAMUSCULAR; INTRAVENOUS at 12:07

## 2019-06-17 RX ADMIN — MIDAZOLAM HYDROCHLORIDE 2 MG: 1 INJECTION, SOLUTION INTRAMUSCULAR; INTRAVENOUS at 11:50

## 2019-06-17 RX ADMIN — MIDAZOLAM HYDROCHLORIDE 2 MG: 1 INJECTION, SOLUTION INTRAMUSCULAR; INTRAVENOUS at 12:08

## 2019-06-17 NOTE — DISCHARGE INSTRUCTIONS
Patient Education        Electrical Cardioversion: What to Expect at Home  Your Recovery    Electrical cardioversion is a treatment for an abnormal heartbeat, such as atrial fibrillation, supraventricular tachycardia, or ventricular tachycardia (VT). It uses a brief electrical shock to reset your heart's rhythm. After cardioversion, you may have redness, like a sunburn, where the patches were. The medicines you got to make you sleepy may make you feel drowsy for the rest of the day. Your doctor may have you take medicines to help the heart beat normally and to prevent blood clots. This care sheet gives you a general idea about how long it will take for you to recover. But each person recovers at a different pace. Follow the steps below to feel better as quickly as possible. How can you care for yourself at home? Medicines    · Be safe with medicines. Take your medicines exactly as prescribed. Call your doctor if you think you are having a problem with your medicine. You may take one or more of the following medicines:  ? Rate-control medicines to slow the heart rate. These include beta-blockers, calcium channel blockers, and digoxin. ? Rhythm control medicines that help the heart keep a normal rhythm. ? Blood thinners, also called anticoagulants, which help prevent blood clots. You will get more details on the specific medicines your doctor prescribes. Be sure you know how to take your medicines safely.     · Do not take any vitamins, over-the-counter medicines, or herbal products without talking to your doctor first.   Exercise    · Start light exercise if your doctor says that it is okay. Even a small amount will help you get stronger, have more energy, and manage your stress. Walking is an easy way to get exercise. Start out by walking a little more than you did in the hospital. Bit by bit, increase the amount you walk.     · When you exercise, watch for signs that your heart is working too hard.  You are pushing too hard if you cannot talk while you are exercising. If you become short of breath or dizzy or have chest pain, sit down and rest right away.     · Check your pulse regularly. Place two fingers on the artery at the palm side of your wrist in line with your thumb. If your heartbeat seems uneven or fast, talk to your doctor. Other instructions    · Ask your doctor when you can drive again.     · Do not smoke. If you need help quitting, talk to your doctor about stop-smoking programs and medicines. These can increase your chances of quitting for good.     · Limit alcohol. Follow-up care is a key part of your treatment and safety. Be sure to make and go to all appointments, and call your doctor if you are having problems. It's also a good idea to know your test results and keep a list of the medicines you take. When should you call for help? Call 911 anytime you think you may need emergency care. For example, call if:    · You passed out (lost consciousness).     · You have chest pain or pressure. This may occur with:  ? Sweating. ? Shortness of breath. ? Nausea or vomiting. ? Pain that spreads from the chest to the neck, jaw, or one or both shoulders or arms. ? A fast or uneven pulse. After calling 911, the  may tell you to chew 1 adult-strength or 2 to 4 low-dose aspirin. Wait for an ambulance. Do not try to drive yourself.     · You have symptoms of a stroke. These may include:  ? Sudden numbness, tingling, weakness, or loss of movement in your face, arm, or leg, especially on only one side of your body. ? Sudden vision changes. ? Sudden trouble speaking. ? Sudden confusion or trouble understanding simple statements. ? Sudden problems with walking or balance.   ? A sudden, severe headache that is different from past headaches.    Call your doctor now or seek immediate medical care if:    · You feel dizzy or lightheaded, or you feel like you may faint.     · You have a fast or irregular heartbeat.    Watch closely for any changes in your health, and be sure to contact your doctor if you have any problems. Where can you learn more? Go to http://ken-serena.info/. Enter A617 in the search box to learn more about \"Electrical Cardioversion: What to Expect at Home. \"  Current as of: July 22, 2018  Content Version: 11.9  © 0221-5834 [employer]. Care instructions adapted under license by [employer] (which disclaims liability or warranty for this information). If you have questions about a medical condition or this instruction, always ask your healthcare professional. [address] any warranty or liability for your use of this information.

## 2019-06-17 NOTE — PROGRESS NOTES
Patient received to [address] [place] [address] room # 8  Ambulatory from [facility]. Patient scheduled for CVN today with Dr Fernando Webb. Procedure reviewed & questions answered, voiced good understanding consent obtained & placed on chart. All medications and medical history reviewed. Will prep patient per orders. Patient & family updated on plan of care. The patient has a fraility score of 3-MANAGING WELL, based on reports recent SOB.

## 2019-06-17 NOTE — PROGRESS NOTES
Cardioversion completed. Patient received 20mg Versed. Patient shocked x1 at 54J. Patient converted to NSR. Will continue to monitor until ready for discharge.

## 2019-06-17 NOTE — PROCEDURES
Brief Cardiac Procedure Note    Patient: Storm Moreno MRN: [id number]  SSN: [id number]    YOB: 1960  Age: 63 y.o. Sex: male      Date of Procedure: 6/17/2019     Pre-procedure Diagnosis: Atrial Fibrillation/Atrial Flutter    Post-procedure Diagnosis: same    Reason for Procedure: Cardiac Arrhythmia    Procedure: Cardioversion    Brief Description of Procedure:     Performed By: Michelle Mayer MD     Assistants:     Anesthesia: Moderate Sedation    Estimated Blood Loss: Less than 10 mL      Specimens: None    Implants: None    Findings: nsr after 50 joules    Complications: None    Recommendations: Continue medical therapy.     Signed By: Michelle Mayer MD     June 17, 2019

## 2019-06-18 NOTE — PROCEDURES
[address] [facility]  CARDIAC CATH    Name:  Anna Infante  MR#:  [id number]  :  1960  ACCOUNT #:  [de-identified]  DATE OF SERVICE:  2019      PROCEDURE PERFORMED:  Electrical cardioversion. PREOPERATIVE DIAGNOSIS:  Persistent atrial fibrillation. POSTOPERATIVE DIAGNOSIS:  Persistent atrial fibrillation. SURGEON:  Pablito Ha MD    ASSISTANT:  None. ESTIMATED BLOOD LOSS:  None. SPECIMEN REMOVED:  None. COMPLICATIONS:  None. IMPLANTS:  None    ANESTHESIA:  Conscious sedation. HISTORY:  This is a 50-year-old gentleman with a history of prior AFib ablation. He recently had recurrence of a fib and was  started on sotalol andhad a MOSES cardioversion. He has had recurrence again and is scheduled for a repeat electrical cardioversion. PROCEDURE:  After adequate sedation with Versed, the patient received one shock 50 joules for atrial flutter. This resulted in conversion to normal sinus rhythm. IMPRESSION:  Successful electrical cardioversion for persistent atrial flutter.       Good Alonzo MD      [id number]  D:  2019 12:44  T:  2019 12:49  JOB #:  [id number]

## 2019-09-20 ENCOUNTER — HOSPITAL ENCOUNTER (OUTPATIENT)
Dept: CT IMAGING | Age: 59
Discharge: HOME | End: 2019-09-20
Attending: INTERNAL MEDICINE
Payer: MEDICAID

## 2019-09-20 DIAGNOSIS — Q23.1 BICUSPID AORTIC VALVE: ICD-10-CM

## 2019-09-20 LAB — CREAT BLD-MCNC: 1 MG/DL (ref 0.8–1.5)

## 2019-09-20 PROCEDURE — 74011636320 HC RX REV CODE- 636/320: Performed by: INTERNAL MEDICINE

## 2019-09-20 PROCEDURE — 74011000258 HC RX REV CODE- 258: Performed by: INTERNAL MEDICINE

## 2019-09-20 PROCEDURE — 71275 CT ANGIOGRAPHY CHEST: CPT

## 2019-09-20 PROCEDURE — 82565 ASSAY OF CREATININE: CPT

## 2019-09-20 RX ORDER — SODIUM CHLORIDE 0.9 % (FLUSH) 0.9 %
10 SYRINGE (ML) INJECTION
Status: COMPLETED | OUTPATIENT
Start: 2019-09-20 | End: 2019-09-20

## 2019-09-20 RX ADMIN — SODIUM CHLORIDE 100 ML: 900 INJECTION, SOLUTION INTRAVENOUS at 10:53

## 2019-09-20 RX ADMIN — IOPAMIDOL 75 ML: 755 INJECTION, SOLUTION INTRAVENOUS at 10:52

## 2019-09-20 RX ADMIN — Medication 10 ML: at 10:53

## 2019-10-10 NOTE — PROGRESS NOTES
Just wanted to make sure you got this CT result on our mutual patient. Looks like we can continue to monitor at this point?

## 2020-03-31 PROBLEM — E66.01 SEVERE OBESITY (HCC): Status: ACTIVE | Noted: 2020-03-31

## 2020-09-11 PROBLEM — I71.20 THORACIC AORTIC ANEURYSM: Status: ACTIVE | Noted: 2020-09-11

## 2021-08-03 PROBLEM — I48.91 A-FIB (HCC): Status: RESOLVED | Noted: 2019-02-07 | Resolved: 2021-08-03

## 2021-10-13 NOTE — H&P
[facility] Cardiology/Electrophyiology Consult                Date of  Admission: 2/7/2019  5:52 AM     CC/Reason for admission: afib ablation    Rolly Martinez is a 63 y.o. male admitted for Paroxysmal atrial fibrillation ([email or web address] [I48.0]. 63 y.o. male with a past medical and cardiac history significant for pAF failing flecainide therapy and presents for scheduled AF ablation. Pt reports he was diagnosed with afib about 2 years ago, but was having episodes prior to diagnosis. He has been on flecainide for the past year, but has been having breakthrough afib on flecainide that has been worsening over the past few months. He describes feeling rapid palpitations associated with SOB, fatigue, low energy. Episodes last usually about 2-3 hours, but has lasted close to 24 hours in the past. Pt afib is a recurrent problem, no aggravating or alleviating factors, with symptoms as noted above. Cardiac PMH: (Old records have been reviewed and summarized below)    Patient Active Problem List   Diagnosis Code    Right Tibia Fracture S82.209A    Leukocytosis D72.829    AF (atrial fibrillation) ([facility] I48.91    Hypothyroidism E03.9    Aortic valve stenosis I35.0    Dyspnea R06.00    Palpitations R00.2    Hypertension I10       Past Medical History:   Diagnosis Date    AF (atrial fibrillation) ([email or web address] 1/2/2010    Aortic valve stenosis 12/28/2015    Dyspnea 12/28/2015    Essential hypertension 12/28/2015    Hypertension     Hyperthyroidism     hypothyroid    Nausea & vomiting     Other ill-defined conditions(799.89)     fx left ankle Jun 2009    Other unknown and unspecified cause of morbidity or mortality     afib    Palpitations 12/28/2015    Psychiatric disorder     Thyroid disease     hypothyroidism       Past Surgical History:   Procedure Laterality Date    ABDOMEN SURGERY PROC UNLISTED      fistula repair, colon    HX HEENT      tonsilectomy     HX ORTHOPAEDIC      hardware in R ankle.       No Known Allergies   History reviewed. No pertinent family history. No current facility-administered medications for this encounter. Review of Symptoms:  A comprehensive ROS was performed with the pertinent positives and negatives mentioned in the HPI, all other systems reviewed and are negative       Physical Exam  Vitals:    02/07/19 0630   BP: (!) 154/95   Pulse: 60   Resp: 18   SpO2: 95%   Weight: 117.9 kg (260 lb)   Height: 6' 2\" (1.88 m)       Physical Exam:  Gen: well appearing, well developed, NAD  Eyes: Pupils equal, EOMI  ENT: oropharynx clear, no oral lesions, normal dentition  CV: RRR, no M/R/G, PMI not palpable, normal JVD, no carotid bruits, normal distal pulses, no HANNAH  Pulm: CTAB, no accessory muscle uses, no wheezes, crackles  GI: soft, NT, ND    Cardiographics    Telemetry:   ECG (Indpendently visualized and interpreted):  Echocardiogram:     Labs:   Recent Labs     02/07/19  0619 02/04/19  0926    140   K 3.9 4.2   MG 2.3 2.1   BUN 17 16   CREA 1.10 1.20   * 123*   WBC 7.8 8.4   HGB 15.5 15.9   HCT 48.4 48.8    260   INR 1.0  --         Assessment:      Active Problems:    AF (atrial fibrillation) ([facility] (1/2/2010)           Plan:   1. Paroxysmal atrial fibrillation, uncontrolled: 57yo with recurrent symptomatic pAF failing flecainide therapy and is an excellent candidate for continued rhythm control and presents today for scheduled AF ablation. Answered all questions and concerns, and patient wishes to proceed. Jaren Almendarez MD, MS  Cardiology/Electrophysiology 15

## 2022-03-18 PROBLEM — I48.91 ATRIAL FIBRILLATION WITH RVR (HCC): Status: ACTIVE | Noted: 2019-06-04

## 2022-03-18 PROBLEM — Q23.1 BICUSPID AORTIC VALVE: Status: ACTIVE | Noted: 2019-03-06

## 2022-03-19 PROBLEM — E66.01 SEVERE OBESITY (HCC): Status: ACTIVE | Noted: 2020-03-31

## 2022-03-19 PROBLEM — I71.20 THORACIC AORTIC ANEURYSM: Status: ACTIVE | Noted: 2020-09-11

## 2022-06-30 ENCOUNTER — HOSPITAL ENCOUNTER (EMERGENCY)
Age: 62
Discharge: HOME | End: 2022-07-01
Attending: EMERGENCY MEDICINE
Payer: MEDICAID

## 2022-06-30 ENCOUNTER — APPOINTMENT (OUTPATIENT)
Dept: GENERAL RADIOLOGY | Age: 62
End: 2022-06-30
Payer: MEDICAID

## 2022-06-30 DIAGNOSIS — R07.9 CHEST PAIN, UNSPECIFIED TYPE: Primary | ICD-10-CM

## 2022-06-30 DIAGNOSIS — I48.0 PAROXYSMAL ATRIAL FIBRILLATION (HCC): ICD-10-CM

## 2022-06-30 LAB
ALBUMIN SERPL-MCNC: 3.7 G/DL (ref 3.2–4.6)
ALBUMIN/GLOB SERPL: 0.9 {RATIO} (ref 1.2–3.5)
ALP SERPL-CCNC: 72 U/L (ref 50–136)
ALT SERPL-CCNC: 104 U/L (ref 12–65)
ANION GAP SERPL CALC-SCNC: 10 MMOL/L (ref 7–16)
AST SERPL-CCNC: 78 U/L (ref 15–37)
BILIRUB SERPL-MCNC: 0.6 MG/DL (ref 0.2–1.1)
BUN SERPL-MCNC: 25 MG/DL (ref 8–23)
CALCIUM SERPL-MCNC: 9.2 MG/DL (ref 8.3–10.4)
CHLORIDE SERPL-SCNC: 107 MMOL/L (ref 98–107)
CO2 SERPL-SCNC: 23 MMOL/L (ref 21–32)
CREAT SERPL-MCNC: 1.3 MG/DL (ref 0.8–1.5)
ERYTHROCYTE [DISTWIDTH] IN BLOOD BY AUTOMATED COUNT: 13.1 % (ref 11.9–14.6)
GLOBULIN SER CALC-MCNC: 4.2 G/DL (ref 2.3–3.5)
GLUCOSE SERPL-MCNC: 93 MG/DL (ref 65–100)
HCT VFR BLD AUTO: 46.6 % (ref 41.1–50.3)
HGB BLD-MCNC: 15.4 G/DL (ref 13.6–17.2)
MAGNESIUM SERPL-MCNC: 2.5 MG/DL (ref 1.8–2.4)
MCH RBC QN AUTO: 31.6 PG (ref 26.1–32.9)
MCHC RBC AUTO-ENTMCNC: 33 G/DL (ref 31.4–35)
MCV RBC AUTO: 95.7 FL (ref 79.6–97.8)
NRBC # BLD: 0 K/UL (ref 0–0.2)
PLATELET # BLD AUTO: 180 K/UL (ref 150–450)
PMV BLD AUTO: 12.6 FL (ref 9.4–12.3)
POTASSIUM SERPL-SCNC: 3.9 MMOL/L (ref 3.5–5.1)
PROT SERPL-MCNC: 7.9 G/DL (ref 6.3–8.2)
RBC # BLD AUTO: 4.87 M/UL (ref 4.23–5.6)
SODIUM SERPL-SCNC: 140 MMOL/L (ref 138–145)
TROPONIN I SERPL HS-MCNC: 20 PG/ML (ref 0–14)
TSH W FREE THYROID IF ABNORMAL: 1.32 UIU/ML (ref 0.36–3.74)
WBC # BLD AUTO: 9.1 K/UL (ref 4.3–11.1)

## 2022-06-30 PROCEDURE — 84484 ASSAY OF TROPONIN QUANT: CPT

## 2022-06-30 PROCEDURE — 85027 COMPLETE CBC AUTOMATED: CPT

## 2022-06-30 PROCEDURE — 2580000003 HC RX 258: Performed by: EMERGENCY MEDICINE

## 2022-06-30 PROCEDURE — 71045 X-RAY EXAM CHEST 1 VIEW: CPT

## 2022-06-30 PROCEDURE — 93005 ELECTROCARDIOGRAM TRACING: CPT | Performed by: EMERGENCY MEDICINE

## 2022-06-30 PROCEDURE — 84443 ASSAY THYROID STIM HORMONE: CPT

## 2022-06-30 PROCEDURE — 80053 COMPREHEN METABOLIC PANEL: CPT

## 2022-06-30 PROCEDURE — 99285 EMERGENCY DEPT VISIT HI MDM: CPT

## 2022-06-30 PROCEDURE — 83735 ASSAY OF MAGNESIUM: CPT

## 2022-06-30 RX ORDER — SODIUM CHLORIDE 0.9 % (FLUSH) 0.9 %
3 SYRINGE (ML) INJECTION EVERY 8 HOURS
Status: DISCONTINUED | OUTPATIENT
Start: 2022-06-30 | End: 2022-07-01 | Stop reason: HOSPADM

## 2022-06-30 RX ADMIN — SODIUM CHLORIDE, PRESERVATIVE FREE 3 ML: 5 INJECTION INTRAVENOUS at 21:19

## 2022-06-30 ASSESSMENT — ENCOUNTER SYMPTOMS
SHORTNESS OF BREATH: 1
FACIAL SWELLING: 0
ABDOMINAL PAIN: 0
VOMITING: 0
DIARRHEA: 0

## 2022-06-30 ASSESSMENT — PAIN - FUNCTIONAL ASSESSMENT: PAIN_FUNCTIONAL_ASSESSMENT: NONE - DENIES PAIN

## 2022-06-30 ASSESSMENT — PAIN DESCRIPTION - FREQUENCY: FREQUENCY: OTHER (COMMENT)

## 2022-06-30 ASSESSMENT — PAIN DESCRIPTION - ONSET: ONSET: SUDDEN

## 2022-07-01 VITALS
DIASTOLIC BLOOD PRESSURE: 96 MMHG | OXYGEN SATURATION: 99 % | HEIGHT: 74 IN | WEIGHT: 280 LBS | RESPIRATION RATE: 17 BRPM | TEMPERATURE: 97.8 F | SYSTOLIC BLOOD PRESSURE: 141 MMHG | BODY MASS INDEX: 35.94 KG/M2 | HEART RATE: 55 BPM

## 2022-07-01 LAB
EKG ATRIAL RATE: 51 BPM
EKG DIAGNOSIS: NORMAL
EKG P AXIS: 18 DEGREES
EKG P-R INTERVAL: 146 MS
EKG Q-T INTERVAL: 446 MS
EKG QRS DURATION: 105 MS
EKG QTC CALCULATION (BAZETT): 411 MS
EKG R AXIS: -17 DEGREES
EKG T AXIS: 19 DEGREES
EKG VENTRICULAR RATE: 51 BPM
TROPONIN I SERPL HS-MCNC: 17.1 PG/ML (ref 0–14)

## 2022-07-01 ASSESSMENT — PAIN - FUNCTIONAL ASSESSMENT: PAIN_FUNCTIONAL_ASSESSMENT: 0-10

## 2022-07-01 ASSESSMENT — PAIN SCALES - GENERAL: PAINLEVEL_OUTOF10: 0

## 2022-07-01 NOTE — ED PROVIDER NOTES
[employer] Emergency Department Provider Note                   PCP:                None Provider               Age: 65 y.o. Sex: male       ICD-10-CM    1. Chest pain, unspecified type  R07.9    2. Paroxysmal atrial fibrillation (HCC)  I48.0        DISPOSITION Decision To Discharge 07/01/2022 12:15:47 AM       New Prescriptions    No medications on file       Orders Placed This Encounter   Procedures    XR CHEST PORTABLE    Troponin    CBC    Comprehensive Metabolic Panel    TSH with Reflex    Magnesium    Cardiac Monitor    Pulse Oximetry    EKG 12 Lead    Saline lock Donald Casiano MD 12:16 AM      MDM  Number of Diagnoses or Management Options  Chest pain, unspecified type  Paroxysmal atrial fibrillation [facility])  Diagnosis management comments: I wore appropriate PPE throughout this patient's ED visit. Curly Hall MD, 9:26 PM    Converted in route. Will check labs. 12:16 AM  Has remained in sinus rhythm. Labs unremarkable. Chronically slightly elevated troponin without delta increase. Amount and/or Complexity of Data Reviewed  Clinical lab tests: ordered and reviewed  Review and summarize past medical records: yes  Independent visualization of images, tracings, or specimens: yes         Kimberly Miranda is a 65 y.o. male who presents to the Emergency Department with chief complaint of    Chief Complaint   Patient presents with    Chest Pain      71-year-old male presents with chest pain, shortness of breath, and palpitations. He was just admitted to [facility] June 26 to the 29th for paroxysmal atrial fibrillation after missing 3 doses of sotalol. Patient learned that his son passed away today. He tried to get into the building to see him, but was forced out by the police. He was tased multiple times and taken to correction. He was not given his nightly dose of sotalol and developed palpitations.   EMS found patient in A. fib with RVR at a rate of 150s gave 20 mg of Cardizem with conversion to normal sinus rhythm. All symptoms now resolved. All other systems reviewed and are negative. Review of Systems   Constitutional: Negative for fever. HENT: Negative for facial swelling. Eyes: Negative for visual disturbance. Respiratory: Positive for shortness of breath. Cardiovascular: Positive for chest pain and palpitations. Gastrointestinal: Negative for abdominal pain, diarrhea and vomiting. Musculoskeletal: Negative for joint swelling. Skin: Negative for rash. Neurological: Negative for speech difficulty. Psychiatric/Behavioral: Negative for confusion. All other systems reviewed and are negative. Past Medical History:   Diagnosis Date    AF (atrial fibrillation) ([facility] [address] 1/2/2010    Aortic valve stenosis 12/28/2015    Dyspnea 12/28/2015    Essential hypertension 12/28/2015    Hypertension     Hyperthyroidism     hypothyroid    Nausea & vomiting     Other ill-defined conditions(799.89)     fx left ankle Jun 2009    Other unknown and unspecified cause of morbidity or mortality     afib    Palpitations 12/28/2015    Psychiatric disorder     Thyroid disease     hypothyroidism         Past Surgical History:   Procedure Laterality Date    HEENT      tonsilectomy     ORTHOPEDIC SURGERY      hardware in R ankle.  OR ABDOMEN SURGERY PROC UNLISTED      fistula repair, colon        No family history on file. Social Connections:     Frequency of Communication with Friends and Family: Not on file    Frequency of Social Gatherings with Friends and Family: Not on file    Attends Christian Services: Not on file    Active Member of Clubs or Organizations: Not on file    Attends Club or Organization Meetings: Not on file    Marital Status: Not on file        Allergies   Allergen Reactions    Morphine Nausea And Vomiting        Vitals signs and nursing note reviewed.    Patient Vitals for the past 4 hrs:   Temp Pulse Resp BP SpO2   06/30/22 2330 -- 56 21 (!) 145/88 95 %   06/30/22 2200 -- 58 14 (!) 141/92 94 %   06/30/22 2105 98.3 °F (36.8 °C) 59 20 (!) 155/94 95 %          Physical Exam  Vitals and nursing note reviewed. Constitutional:       Appearance: Normal appearance. HENT:      Head: Normocephalic and atraumatic. Nose: Nose normal.      Mouth/Throat:      Mouth: Mucous membranes are moist.   Eyes:      Extraocular Movements: Extraocular movements intact. Pupils: Pupils are equal, round, and reactive to light. Cardiovascular:      Rate and Rhythm: Normal rate and regular rhythm. Pulmonary:      Effort: Pulmonary effort is normal. No respiratory distress. Abdominal:      General: Abdomen is flat. There is no distension. Musculoskeletal:         General: No deformity. Normal range of motion. Cervical back: Normal range of motion and neck supple. Skin:     General: Skin is warm and dry. Neurological:      General: No focal deficit present. Mental Status: He is alert. Mental status is at baseline. Psychiatric:         Mood and Affect: Affect is angry.           Procedures    ED EKG Interpretation  EKG was interpreted in the absence of a cardiologist.    Rate: Rate: Bradycardia  EKG Interpretation: EKG Interpretation: sinus rhythm  ST Segments: Nonspecific ST segments - NO STEMI    Labs Reviewed   TROPONIN - Abnormal; Notable for the following components:       Result Value    Troponin, High Sensitivity 20.0 (*)     All other components within normal limits   TROPONIN - Abnormal; Notable for the following components:    Troponin, High Sensitivity 17.1 (*)     All other components within normal limits   CBC - Abnormal; Notable for the following components:    MPV 12.6 (*)     All other components within normal limits   COMPREHENSIVE METABOLIC PANEL - Abnormal; Notable for the following components:    BUN 25 (*)     GFR Non- 60 (*)      (*)     AST 78 (*)     Globulin 4.2 (*) Albumin/Globulin Ratio 0.9 (*)     All other components within normal limits   MAGNESIUM - Abnormal; Notable for the following components:    Magnesium 2.5 (*)     All other components within normal limits   TSH WITH REFLEX        XR CHEST PORTABLE   Final Result   No acute process. Voice dictation software was used during the making of this note. This software is not perfect and grammatical and other typographical errors may be present. This note has not been completely proofread for errors.      Artem Arvizu MD  07/01/22 0531

## 2022-07-01 NOTE — ED TRIAGE NOTES
Pt. Arrives with EMS, called for chest pain. Hx of Afib x 3 years, controlled with sotolol. Recent time in assisted without access to his medications. CP began hours ago. Afib -150 on EMS arrival without nausea or vomiting. C/o generalized weakness. 20g L hand. 20mg dilt given and converted HR to 60s NSR. 98% on RA. Pt. Is AAOx3.

## 2022-07-01 NOTE — ED NOTES
I have reviewed discharge instructions with the patient and girlfriend. The patient verbalized understanding. Patient left ED via Discharge Method: ambulatory to Home with girlfriend. Opportunity for questions and clarification provided. Patient given 0 scripts. To continue your aftercare when you leave the hospital, you may receive an automated call from our care team to check in on how you are doing. This is a free service and part of our promise to provide the best care and service to meet your aftercare needs.  If you have questions, or wish to unsubscribe from this service please call [phone]. Thank you for Choosing our [facility] Emergency Department.       Nicolasa Crowe RN  07/01/22 2889

## 2022-07-05 ENCOUNTER — OFFICE VISIT (OUTPATIENT)
Dept: CARDIOLOGY CLINIC | Age: 62
End: 2022-07-05
Payer: MEDICAID

## 2022-07-05 VITALS
DIASTOLIC BLOOD PRESSURE: 72 MMHG | BODY MASS INDEX: 36.06 KG/M2 | SYSTOLIC BLOOD PRESSURE: 108 MMHG | WEIGHT: 281 LBS | HEART RATE: 78 BPM | HEIGHT: 74 IN

## 2022-07-05 DIAGNOSIS — I10 PRIMARY HYPERTENSION: Primary | ICD-10-CM

## 2022-07-05 DIAGNOSIS — I48.91 ATRIAL FIBRILLATION WITH RVR (HCC): ICD-10-CM

## 2022-07-05 DIAGNOSIS — I71.20 THORACIC AORTIC ANEURYSM WITHOUT RUPTURE: ICD-10-CM

## 2022-07-05 DIAGNOSIS — I35.0 NONRHEUMATIC AORTIC VALVE STENOSIS: ICD-10-CM

## 2022-07-05 PROCEDURE — 99214 OFFICE O/P EST MOD 30 MIN: CPT | Performed by: INTERNAL MEDICINE

## 2022-07-05 RX ORDER — SOTALOL HYDROCHLORIDE 120 MG/1
120 TABLET ORAL 2 TIMES DAILY
Qty: 180 TABLET | Refills: 3 | Status: SHIPPED | OUTPATIENT
Start: 2022-07-05 | End: 2022-08-22 | Stop reason: SDUPTHER

## 2022-07-05 RX ORDER — LISINOPRIL AND HYDROCHLOROTHIAZIDE 12.5; 1 MG/1; MG/1
1 TABLET ORAL DAILY
COMMUNITY
Start: 2022-06-20 | End: 2022-09-18

## 2022-07-05 RX ORDER — CLONAZEPAM 1 MG/1
1 TABLET ORAL 2 TIMES DAILY PRN
COMMUNITY

## 2022-07-05 NOTE — PROGRESS NOTES
[facility] CARDIOLOGY  [address], [address]  PHONE: [phone]      22    NAME:  Sharmila Villatoro : 1960  MRN: [id number]       SUBJECTIVE:   Sharmila Villatoro is a 65 y.o. male seen for a follow up visit regarding the following:     Chief Complaint   Patient presents with    Follow-Up from Hospital         HPI:  Lots of stress with passing of his son. 10 day intermittent palpitations. Increased palpitations associated with weakness. No paredes. No orthopnea or pnd. No palpitations or syncope. Af at [facility] in [facility]. Son passed recently. Past Medical History, Past Surgical History, Family history, Social History, and Medications were all reviewed with the patient today and updated as necessary. Current Outpatient Medications   Medication Sig Dispense Refill    clonazePAM (KLONOPIN) 1 MG tablet Take 1 mg by mouth 2 times daily as needed.  lisinopril-hydroCHLOROthiazide (PRINZIDE;ZESTORETIC) 10-12.5 MG per tablet Take 1 tablet by mouth daily      sotalol (BETAPACE) 120 MG tablet Take 1 tablet by mouth 2 times daily TAKE 1 TABLET BY MOUTH EVERY 12 HOURS 180 tablet 3    aspirin 81 MG EC tablet Take by mouth daily      busPIRone (BUSPAR) 15 MG tablet Take 15 mg by mouth 2 times daily      escitalopram (LEXAPRO) 20 MG tablet Take 20 mg by mouth daily      levothyroxine (SYNTHROID) 137 MCG tablet Take by mouth every morning (before breakfast)      lovastatin (MEVACOR) 40 MG tablet Take 40 mg by mouth daily      meloxicam (MOBIC) 15 MG tablet TAKE 1/2 TABLET BY MOUTH TWICE A DAY      lisinopril (PRINIVIL;ZESTRIL) 10 MG tablet Take 10 mg by mouth daily (Patient not taking: Reported on 2022)       No current facility-administered medications for this visit.                Social History     Tobacco Use    Smoking status: Current Some Day Smoker    Smokeless tobacco: Former User    Tobacco comment: Quit smoking: occassionally cigars Substance Use Topics    Alcohol use: Yes              PHYSICAL EXAM:   /72   Pulse 78   Ht 6' 2\" (1.88 m)   Wt 281 lb (127.5 kg) Comment: with shoes  BMI 36.08 kg/m²    Constitutional: Oriented to person, place, and time. Appears well-developed and well-nourished. Head: Normocephalic and atraumatic. Neck: Neck supple. Cardiovascular: Normal rate and regular rhythm with no murmur -No JVP  Pulmonary/Chest: Breath sounds normal.   Abdominal: Soft. Musculoskeletal: No edema. Neurological: Alert and oriented to person, place, and time. Skin: Skin is warm and dry. Psychiatric: Normal mood and affect. Vitals reviewed. Wt Readings from Last 3 Encounters:   07/05/22 281 lb (127.5 kg)   06/30/22 280 lb (127 kg)   02/07/22 295 lb (133.8 kg)   ekg- nsr    Medical problems and test results were reviewed with the patient today. ASSESSMENT and PLAN    1. Primary hypertension  Stable. Continue lisinopril      2. Atrial fibrillation with RVR (HCC)  Improved with current therapy. Will continue medications  Continue sotalol    3. Nonrheumatic aortic valve stenosis  Stable. Continue yearly echo      4. Thoracic aortic aneurysm without rupture (HCC)  Stable. Continue surveillence  Last cta ao 4.8 cm     5. Dyslipidemia  Stable. Continue mevacor      Return in about 6 weeks (around 8/16/2022).          Katty Holly MD  7/5/2022  4:29 PM

## 2022-08-22 ENCOUNTER — HOSPITAL ENCOUNTER (OUTPATIENT)
Dept: LAB | Age: 62
Discharge: HOME | End: 2022-08-25

## 2022-08-22 ENCOUNTER — TELEPHONE (OUTPATIENT)
Dept: CARDIOLOGY CLINIC | Age: 62
End: 2022-08-22

## 2022-08-22 ENCOUNTER — OFFICE VISIT (OUTPATIENT)
Dept: CARDIOLOGY CLINIC | Age: 62
End: 2022-08-22

## 2022-08-22 VITALS
SYSTOLIC BLOOD PRESSURE: 98 MMHG | HEART RATE: 60 BPM | BODY MASS INDEX: 36.83 KG/M2 | HEIGHT: 74 IN | WEIGHT: 287 LBS | DIASTOLIC BLOOD PRESSURE: 76 MMHG

## 2022-08-22 DIAGNOSIS — Q23.1 BICUSPID AORTIC VALVE: ICD-10-CM

## 2022-08-22 DIAGNOSIS — I71.20 THORACIC AORTIC ANEURYSM WITHOUT RUPTURE: Primary | ICD-10-CM

## 2022-08-22 DIAGNOSIS — I48.91 ATRIAL FIBRILLATION WITH RVR (HCC): ICD-10-CM

## 2022-08-22 DIAGNOSIS — I10 PRIMARY HYPERTENSION: ICD-10-CM

## 2022-08-22 PROBLEM — I20.0 ACCELERATING ANGINA (HCC): Status: ACTIVE | Noted: 2022-08-22

## 2022-08-22 LAB
ANION GAP SERPL CALC-SCNC: 2 MMOL/L (ref 7–16)
ANION GAP SERPL CALC-SCNC: 3 MMOL/L (ref 7–16)
BUN SERPL-MCNC: 24 MG/DL (ref 8–23)
BUN SERPL-MCNC: 25 MG/DL (ref 8–23)
CALCIUM SERPL-MCNC: 9.2 MG/DL (ref 8.3–10.4)
CALCIUM SERPL-MCNC: 9.3 MG/DL (ref 8.3–10.4)
CHLORIDE SERPL-SCNC: 107 MMOL/L (ref 98–107)
CHLORIDE SERPL-SCNC: 110 MMOL/L (ref 98–107)
CO2 SERPL-SCNC: 26 MMOL/L (ref 21–32)
CO2 SERPL-SCNC: 29 MMOL/L (ref 21–32)
CREAT SERPL-MCNC: 1.4 MG/DL (ref 0.8–1.5)
CREAT SERPL-MCNC: 1.5 MG/DL (ref 0.8–1.5)
ERYTHROCYTE [DISTWIDTH] IN BLOOD BY AUTOMATED COUNT: 13.3 % (ref 11.9–14.6)
ERYTHROCYTE [DISTWIDTH] IN BLOOD BY AUTOMATED COUNT: 13.4 % (ref 11.9–14.6)
GLUCOSE SERPL-MCNC: 103 MG/DL (ref 65–100)
GLUCOSE SERPL-MCNC: 104 MG/DL (ref 65–100)
HCT VFR BLD AUTO: 43.7 % (ref 41.1–50.3)
HCT VFR BLD AUTO: 46.6 % (ref 41.1–50.3)
HGB BLD-MCNC: 13.9 G/DL (ref 13.6–17.2)
HGB BLD-MCNC: 14.5 G/DL (ref 13.6–17.2)
MCH RBC QN AUTO: 31.8 PG (ref 26.1–32.9)
MCH RBC QN AUTO: 32.1 PG (ref 26.1–32.9)
MCHC RBC AUTO-ENTMCNC: 31.1 G/DL (ref 31.4–35)
MCHC RBC AUTO-ENTMCNC: 31.8 G/DL (ref 31.4–35)
MCV RBC AUTO: 100.9 FL (ref 79.6–97.8)
MCV RBC AUTO: 102.2 FL (ref 79.6–97.8)
NRBC # BLD: 0 K/UL (ref 0–0.2)
NRBC # BLD: 0 K/UL (ref 0–0.2)
PLATELET # BLD AUTO: 125 K/UL (ref 150–450)
PLATELET # BLD AUTO: 151 K/UL (ref 150–450)
PMV BLD AUTO: 12.3 FL (ref 9.4–12.3)
PMV BLD AUTO: 12.5 FL (ref 9.4–12.3)
POTASSIUM SERPL-SCNC: 4.3 MMOL/L (ref 3.5–5.1)
POTASSIUM SERPL-SCNC: 4.4 MMOL/L (ref 3.5–5.1)
RBC # BLD AUTO: 4.33 M/UL (ref 4.23–5.6)
RBC # BLD AUTO: 4.56 M/UL (ref 4.23–5.6)
SODIUM SERPL-SCNC: 138 MMOL/L (ref 136–145)
SODIUM SERPL-SCNC: 139 MMOL/L (ref 138–145)
WBC # BLD AUTO: 6.4 K/UL (ref 4.3–11.1)
WBC # BLD AUTO: 6.7 K/UL (ref 4.3–11.1)

## 2022-08-22 PROCEDURE — 99214 OFFICE O/P EST MOD 30 MIN: CPT | Performed by: INTERNAL MEDICINE

## 2022-08-22 RX ORDER — SOTALOL HYDROCHLORIDE 120 MG/1
120 TABLET ORAL 2 TIMES DAILY
Qty: 60 TABLET | Refills: 6 | Status: SHIPPED | OUTPATIENT
Start: 2022-08-22

## 2022-08-22 NOTE — PROGRESS NOTES
[facility] CARDIOLOGY  [address] [facility], [address]  PHONE: [phone]      22    NAME:  Eugene Nicole : 1960  MRN: [id number]       SUBJECTIVE:   Eugene Nicole is a 65 y.o. male seen for a follow up visit regarding the following:     Chief Complaint   Patient presents with    Atrial Fibrillation    Hypertension         HPI:    No cp or paredes. No orthopnea or pnd. No palpitations or syncope. Past Medical History, Past Surgical History, Family history, Social History, and Medications were all reviewed with the patient today and updated as necessary. Current Outpatient Medications   Medication Sig Dispense Refill    sotalol (BETAPACE) 120 MG tablet Take 1 tablet by mouth 2 times daily TAKE 1 TABLET BY MOUTH EVERY 12 HOURS 60 tablet 6    clonazePAM (KLONOPIN) 1 MG tablet Take 1 mg by mouth 2 times daily as needed. lisinopril-hydroCHLOROthiazide (PRINZIDE;ZESTORETIC) 10-12.5 MG per tablet Take 1 tablet by mouth daily      aspirin 81 MG EC tablet Take by mouth daily      busPIRone (BUSPAR) 15 MG tablet Take 15 mg by mouth 2 times daily      escitalopram (LEXAPRO) 20 MG tablet Take 20 mg by mouth daily      levothyroxine (SYNTHROID) 137 MCG tablet Take by mouth every morning (before breakfast)      lovastatin (MEVACOR) 40 MG tablet Take 40 mg by mouth daily      meloxicam (MOBIC) 15 MG tablet TAKE 1/2 TABLET BY MOUTH TWICE A DAY      lisinopril (PRINIVIL;ZESTRIL) 10 MG tablet Take 10 mg by mouth daily (Patient not taking: No sig reported)       No current facility-administered medications for this visit. Social History     Tobacco Use    Smoking status: Some Days    Smokeless tobacco: Former    Tobacco comments:     Quit smoking: occassionally cigars   Substance Use Topics    Alcohol use:  Yes              PHYSICAL EXAM:   BP 98/76   Pulse 60   Ht 6' 2\" (1.88 m)   Wt 287 lb (130.2 kg)   BMI 36.85 kg/m²    Constitutional: Oriented to person,

## 2022-08-22 NOTE — TELEPHONE ENCOUNTER
Family wants to know what he is having done on Friday . He didn't know how to tell them.  Please call

## 2022-08-23 ENCOUNTER — TELEPHONE (OUTPATIENT)
Dept: CARDIOLOGY CLINIC | Age: 62
End: 2022-08-23

## 2022-08-23 NOTE — TELEPHONE ENCOUNTER
Says he is not sure what he heard from Dr Hany Tobar when he was here. He talked with him about a heart cath and then mentioned something about his aorta. Something about cutting in to his chest. He is very confused.  Please call

## 2022-08-23 NOTE — TELEPHONE ENCOUNTER
Spoke with patient. Informed only cath Friday, that he will need AVR but that will done be done on Friday. Patient voiced understanding.

## 2022-08-23 NOTE — TELEPHONE ENCOUNTER
I called and spoke with patient on phone # [phone] on 08/23/2022 at 4:19 PM. Mr. Ashley Cueto confirmed he was vaguely aware that his insurance is out of network with [facility]. He was under the impression that he could potentially qualify patient assistance and I informed him that patient assistance isn't applicable when a patient has coverage and is seeing an out of network provider. I informed him that his options are to contact medicaid and see if he can change to any other medicaid plan besides [employer] OR we can transfer his records to [facility]. I gave patient the precert direct ext to contact me back at the office and let us know how he decides to proceed.